# Patient Record
Sex: FEMALE | Race: WHITE | NOT HISPANIC OR LATINO | URBAN - METROPOLITAN AREA
[De-identification: names, ages, dates, MRNs, and addresses within clinical notes are randomized per-mention and may not be internally consistent; named-entity substitution may affect disease eponyms.]

---

## 2018-07-18 ENCOUNTER — OUTPATIENT (OUTPATIENT)
Dept: OUTPATIENT SERVICES | Facility: HOSPITAL | Age: 48
LOS: 1 days | Discharge: HOME | End: 2018-07-18

## 2018-07-18 DIAGNOSIS — E61.1 IRON DEFICIENCY: ICD-10-CM

## 2018-07-18 DIAGNOSIS — R42 DIZZINESS AND GIDDINESS: ICD-10-CM

## 2018-07-18 DIAGNOSIS — E55.9 VITAMIN D DEFICIENCY, UNSPECIFIED: ICD-10-CM

## 2018-10-25 ENCOUNTER — TRANSCRIPTION ENCOUNTER (OUTPATIENT)
Age: 48
End: 2018-10-25

## 2018-12-28 ENCOUNTER — TRANSCRIPTION ENCOUNTER (OUTPATIENT)
Age: 48
End: 2018-12-28

## 2019-02-17 ENCOUNTER — TRANSCRIPTION ENCOUNTER (OUTPATIENT)
Age: 49
End: 2019-02-17

## 2019-02-20 ENCOUNTER — OUTPATIENT (OUTPATIENT)
Dept: OUTPATIENT SERVICES | Facility: HOSPITAL | Age: 49
LOS: 1 days | Discharge: HOME | End: 2019-02-20

## 2019-02-20 DIAGNOSIS — N39.0 URINARY TRACT INFECTION, SITE NOT SPECIFIED: ICD-10-CM

## 2019-07-26 ENCOUNTER — TRANSCRIPTION ENCOUNTER (OUTPATIENT)
Age: 49
End: 2019-07-26

## 2019-10-23 ENCOUNTER — TRANSCRIPTION ENCOUNTER (OUTPATIENT)
Age: 49
End: 2019-10-23

## 2019-11-02 ENCOUNTER — TRANSCRIPTION ENCOUNTER (OUTPATIENT)
Age: 49
End: 2019-11-02

## 2019-12-24 ENCOUNTER — TRANSCRIPTION ENCOUNTER (OUTPATIENT)
Age: 49
End: 2019-12-24

## 2020-07-13 ENCOUNTER — TRANSCRIPTION ENCOUNTER (OUTPATIENT)
Age: 50
End: 2020-07-13

## 2020-09-03 ENCOUNTER — APPOINTMENT (OUTPATIENT)
Dept: UROLOGY | Facility: CLINIC | Age: 50
End: 2020-09-03

## 2020-10-23 ENCOUNTER — TRANSCRIPTION ENCOUNTER (OUTPATIENT)
Age: 50
End: 2020-10-23

## 2020-12-14 ENCOUNTER — APPOINTMENT (OUTPATIENT)
Dept: UROLOGY | Facility: CLINIC | Age: 50
End: 2020-12-14

## 2021-01-10 ENCOUNTER — TRANSCRIPTION ENCOUNTER (OUTPATIENT)
Age: 51
End: 2021-01-10

## 2021-02-12 ENCOUNTER — LABORATORY RESULT (OUTPATIENT)
Age: 51
End: 2021-02-12

## 2021-02-12 ENCOUNTER — APPOINTMENT (OUTPATIENT)
Dept: UROGYNECOLOGY | Facility: CLINIC | Age: 51
End: 2021-02-12
Payer: MEDICAID

## 2021-02-12 ENCOUNTER — OUTPATIENT (OUTPATIENT)
Dept: OUTPATIENT SERVICES | Facility: HOSPITAL | Age: 51
LOS: 1 days | Discharge: HOME | End: 2021-02-12

## 2021-02-12 ENCOUNTER — TRANSCRIPTION ENCOUNTER (OUTPATIENT)
Age: 51
End: 2021-02-12

## 2021-02-12 VITALS
SYSTOLIC BLOOD PRESSURE: 120 MMHG | BODY MASS INDEX: 29.37 KG/M2 | HEIGHT: 64 IN | WEIGHT: 172 LBS | DIASTOLIC BLOOD PRESSURE: 70 MMHG

## 2021-02-12 DIAGNOSIS — N95.0 POSTMENOPAUSAL BLEEDING: ICD-10-CM

## 2021-02-12 DIAGNOSIS — Z87.898 PERSONAL HISTORY OF OTHER SPECIFIED CONDITIONS: ICD-10-CM

## 2021-02-12 DIAGNOSIS — R39.15 URGENCY OF URINATION: ICD-10-CM

## 2021-02-12 DIAGNOSIS — Z80.9 FAMILY HISTORY OF MALIGNANT NEOPLASM, UNSPECIFIED: ICD-10-CM

## 2021-02-12 DIAGNOSIS — N20.0 CALCULUS OF KIDNEY: ICD-10-CM

## 2021-02-12 PROCEDURE — 99205 OFFICE O/P NEW HI 60 MIN: CPT | Mod: 25

## 2021-02-12 PROCEDURE — 51701 INSERT BLADDER CATHETER: CPT

## 2021-02-13 LAB
APPEARANCE: ABNORMAL
BILIRUBIN URINE: NEGATIVE
BLOOD URINE: ABNORMAL
COLOR: YELLOW
GLUCOSE QUALITATIVE U: NEGATIVE
KETONES URINE: NEGATIVE
LEUKOCYTE ESTERASE URINE: NEGATIVE
NITRITE URINE: NEGATIVE
PH URINE: 6
PROTEIN URINE: NORMAL
SPECIFIC GRAVITY URINE: 1.02
UROBILINOGEN URINE: NORMAL

## 2021-02-13 NOTE — COUNSELING
[FreeTextEntry1] : \par We will notify you of the urine results if they are abnormal\par \par Please increase your water intake to 4 cups of water per day\par \par For 4-5 days, cut one item from the list out of your diet.\par \par After 4-5 days, it it makes a difference, you have to decide if it is worth keeping it out of your diet to help with the urinary issues.\par \par If it does not make a difference, you should add it back into your diet and remove another item for another 4-5 days.\par \par Please stop the myrbetriq\par \par Schedule bladder function testing with my PA, Raquel (UDS without reduction). This test has to be at least 2 weeks from the time you stop the myrbetriq. Please come with a full bladder\par \par Please call the office if you feel like you have an infection because we can not do the bladder function testing in the setting of an infection\par \par Please schedule the sonogram to make sure the womb/uterus is normal. Once the results are normal I can then prescribe vaginal estrogen cream to see if it will help with the burning\par \par Please sign a medical release form for us to get the cysto report from Dr Garcia's office\par \par

## 2021-02-13 NOTE — DISCUSSION/SUMMARY
[FreeTextEntry1] : \par Urinary urgency-\par The pathophysiology of the above condition was discussed with the patient. The patient was given information and education on pelvic floor muscle exercises/rehabilitation, avoidance of dietary bladder irritants, and other strategies to improve bladder control, such as scheduled voiding. She was counseled regarding further management strategies for overactive bladder and urge incontinence including pelvic floor physical therapy, medications or surgical management.\par \par We discussed pelvic floor muscle rehabilitation, sacral neuromodulation (Interstim device), and intravesical Botox A and peripheral tibial nerve stimulation (PTNS). The risks and benefits of all were reviewed and the patient voiced understanding and agrees to consider botox versus PTNS\par \par The patient voiced understanding and agrees to diet changes, stopping the myrbetriq, undergoing urodynamics for further evaluation and will consider either botox or PTNS for third line treatments. We will follow up on her urine tests.\par \par Postmenopausal bleeding-\par  Advised further workup with a pelvic ultrasound to make sure the uterine lining is thin. Advised the patient that without a workup there is always a risk of uterine cancer or precancer that is not being diagnosed. The patient voiced understanding and agrees to the workup.\par \par Vulvar burning-\par Advised the patient that the burning can be secondary to an acute UTI, irritation from recent UTI or sensitivity of the bladder or atrophy. Will send the urine to rule out infectious etiology, start diet changes as needed for sensitivity (was offered gabapentin but the patient declined) and will start treatment for atrophy after she undergoes a pelvic ultrasound and it is normal and will monitor her symptoms with the above treatment. The patient voiced understanding and agrees with the plan.\par \par \par

## 2021-02-13 NOTE — HISTORY OF PRESENT ILLNESS
[FreeTextEntry1] : \par Pt with pelvic floor dysfunction here for urogynecologic evaluation. She describes: \par \par Chief PFD: pressure to urinate\par \par Symptoms of burning in October 2020, seen in urgent care, told urine testing was negative. Had further burning, seen by gyn who said her urine was negative but to take macrobid. Did not have improvement so then was seen by urology who put her on cipro.\par Still has burning now, constant\par 11/20: cysto with possible urethral dilation: Dr Garcia\par 6/15/20: CT: small R stone\par 11/13/20: ultrasound: small R stone\par Records reviewed:\par 1/14/21 cysto: Dr Garcia: "urethral stricture dilated with scope", normal cysto\par \par Pelvic organ prolapse: no bulge, denies splinting\par Stress urinary incontinence: min\par Overactive bladder syndrome: on myrbetriq 50mg, for the last 6 weeks, slight improvement, s/p oxybutynin (no change in symptoms), s/p detrol no change, no glaucoma, voids q2hour secondary to urgency and pressure, no eneuresis, no urge incontinence, no glaucoma\par Voiding dysfunction: yes Incomplete bladder emptying, yes hesitancy \par Lower urinary tract/vaginal symptoms: as above UTIs per year, no hematuria, no dysuria, no bladder pain \par Fecal incontinence: denies\par Defecatory dysfunction: sausage\par Sexual dysfunction: not active\par Pelvic pain: denies\par Vaginal dryness : yes, also reports staining, last time was last week\par \par Her pelvic floor symptoms are significantly bothersome and negatively impacting her quality of life. \par \par

## 2021-02-13 NOTE — PHYSICAL EXAM
[Chaperone Present] : A chaperone was present in the examining room during all aspects of the physical examination [FreeTextEntry1] : Void: 50 cc\par PVR: 5 cc\par Urethra was prepped in sterile fashion and then a sterile catheter was used by me to drain the bladder.\par \par No abdominal incisions noted\par normal perineal sensation\par normal perineal reflexes\par negative cough stress test\par positive atrophy\par positive vestibular tenderness\par no prolapse\par positive urethral hypermobility\par bilateral levator ani spasm,  positive tenderness\par no urethral tenderness\par no bladder tenderness\par mild cervical tenderness\par 1/5 Kegel\par

## 2021-02-15 LAB — BACTERIA UR CULT: NORMAL

## 2021-02-17 ENCOUNTER — NON-APPOINTMENT (OUTPATIENT)
Age: 51
End: 2021-02-17

## 2021-03-10 ENCOUNTER — OUTPATIENT (OUTPATIENT)
Dept: OUTPATIENT SERVICES | Facility: HOSPITAL | Age: 51
LOS: 1 days | Discharge: HOME | End: 2021-03-10
Payer: MEDICAID

## 2021-03-10 ENCOUNTER — RESULT REVIEW (OUTPATIENT)
Age: 51
End: 2021-03-10

## 2021-03-10 DIAGNOSIS — N95.0 POSTMENOPAUSAL BLEEDING: ICD-10-CM

## 2021-03-10 PROCEDURE — 76830 TRANSVAGINAL US NON-OB: CPT | Mod: 26

## 2021-03-19 ENCOUNTER — RESULT CHARGE (OUTPATIENT)
Age: 51
End: 2021-03-19

## 2021-03-19 ENCOUNTER — OUTPATIENT (OUTPATIENT)
Dept: OUTPATIENT SERVICES | Facility: HOSPITAL | Age: 51
LOS: 1 days | Discharge: HOME | End: 2021-03-19

## 2021-03-19 ENCOUNTER — APPOINTMENT (OUTPATIENT)
Dept: UROGYNECOLOGY | Facility: CLINIC | Age: 51
End: 2021-03-19
Payer: MEDICAID

## 2021-03-19 VITALS — BODY MASS INDEX: 29.52 KG/M2 | WEIGHT: 172 LBS | SYSTOLIC BLOOD PRESSURE: 122 MMHG | DIASTOLIC BLOOD PRESSURE: 76 MMHG

## 2021-03-19 PROCEDURE — 51784 ANAL/URINARY MUSCLE STUDY: CPT | Mod: 26

## 2021-03-19 PROCEDURE — 51741 ELECTRO-UROFLOWMETRY FIRST: CPT | Mod: 26

## 2021-03-19 PROCEDURE — 51728 CYSTOMETROGRAM W/VP: CPT | Mod: 26

## 2021-03-19 PROCEDURE — 51797 INTRAABDOMINAL PRESSURE TEST: CPT | Mod: 26

## 2021-03-19 RX ORDER — MIRABEGRON 50 MG/1
50 TABLET, FILM COATED, EXTENDED RELEASE ORAL
Refills: 0 | Status: DISCONTINUED | COMMUNITY
End: 2021-03-19

## 2021-03-22 LAB
BILIRUB UR QL STRIP: NORMAL
CLARITY UR: CLEAR
COLLECTION METHOD: NORMAL
GLUCOSE UR-MCNC: NORMAL
HCG UR QL: 0.2 EU/DL
HCG UR QL: NEGATIVE
HGB UR QL STRIP.AUTO: NORMAL
KETONES UR-MCNC: NORMAL
LEUKOCYTE ESTERASE UR QL STRIP: NORMAL
NITRITE UR QL STRIP: NORMAL
PH UR STRIP: 6
PROT UR STRIP-MCNC: NORMAL
QUALITY CONTROL: YES
SP GR UR STRIP: 1.01

## 2021-03-30 ENCOUNTER — APPOINTMENT (OUTPATIENT)
Dept: NEUROLOGY | Facility: CLINIC | Age: 51
End: 2021-03-30

## 2021-04-07 ENCOUNTER — APPOINTMENT (OUTPATIENT)
Dept: UROGYNECOLOGY | Facility: CLINIC | Age: 51
End: 2021-04-07
Payer: MEDICAID

## 2021-04-07 ENCOUNTER — OUTPATIENT (OUTPATIENT)
Dept: OUTPATIENT SERVICES | Facility: HOSPITAL | Age: 51
LOS: 1 days | Discharge: HOME | End: 2021-04-07

## 2021-04-07 VITALS
HEIGHT: 64 IN | SYSTOLIC BLOOD PRESSURE: 122 MMHG | DIASTOLIC BLOOD PRESSURE: 70 MMHG | WEIGHT: 172 LBS | BODY MASS INDEX: 29.37 KG/M2

## 2021-04-07 DIAGNOSIS — Z87.42 PERSONAL HISTORY OF OTHER DISEASES OF THE FEMALE GENITAL TRACT: ICD-10-CM

## 2021-04-07 PROCEDURE — 99213 OFFICE O/P EST LOW 20 MIN: CPT

## 2021-04-10 NOTE — DISCUSSION/SUMMARY
[FreeTextEntry1] : \par Urinary urgency-\par We discussed pelvic floor muscle rehabilitation, sacral neuromodulation (Interstim device), and intravesical Botox A and peripheral tibial nerve stimulation (PTNS). The risks and benefits of all were reviewed and the patient voiced understanding and agrees to starting PTNS. Will obtain insurance authorization and schedule the patient for PTNS appointments.\par \par

## 2021-04-10 NOTE — HISTORY OF PRESENT ILLNESS
[FreeTextEntry1] : \par Patient is here for further counseling about urinary urgency\par 6/15/20: CT: small R stone\par 11/13/20: ultrasound: small R stone\par Records reviewed:\par 1/14/21 cysto: Dr Garcia: "urethral stricture dilated with scope", normal cysto\par \par on myrbetriq 50mg, for the last 6 weeks, slight improvement\par s/p oxybutynin (no change in symptoms)\par s/p detrol no change, no glaucoma\par \par 3/19/2021 \par Impression: no USUI, can't rule out obstructive voiding since unable to void with catheters in place.\par Plan: 3rd line treatments for urinary urgency\par

## 2021-04-10 NOTE — COUNSELING
[FreeTextEntry1] : \par We will contact you to schedule the 12 visits once we have the authorization from your insurance company for the PTNS\par \par Apply a pea size amount of the cream to the opening of the vagina every night for two weeks followed by three nights per week\par \par Call with any issues

## 2021-04-15 ENCOUNTER — NON-APPOINTMENT (OUTPATIENT)
Age: 51
End: 2021-04-15

## 2021-05-05 ENCOUNTER — RESULT CHARGE (OUTPATIENT)
Age: 51
End: 2021-05-05

## 2021-05-05 ENCOUNTER — OUTPATIENT (OUTPATIENT)
Dept: OUTPATIENT SERVICES | Facility: HOSPITAL | Age: 51
LOS: 1 days | Discharge: HOME | End: 2021-05-05

## 2021-05-05 ENCOUNTER — APPOINTMENT (OUTPATIENT)
Dept: UROGYNECOLOGY | Facility: CLINIC | Age: 51
End: 2021-05-05
Payer: MEDICAID

## 2021-05-05 VITALS
SYSTOLIC BLOOD PRESSURE: 102 MMHG | WEIGHT: 172 LBS | DIASTOLIC BLOOD PRESSURE: 72 MMHG | BODY MASS INDEX: 29.37 KG/M2 | HEIGHT: 64 IN

## 2021-05-05 LAB
BILIRUB UR QL STRIP: NORMAL
CLARITY UR: CLEAR
COLLECTION METHOD: NORMAL
GLUCOSE UR-MCNC: NORMAL
HCG UR QL: 0.2 EU/DL
HCG UR QL: NEGATIVE
HGB UR QL STRIP.AUTO: NORMAL
KETONES UR-MCNC: NORMAL
LEUKOCYTE ESTERASE UR QL STRIP: NORMAL
NITRITE UR QL STRIP: NORMAL
PH UR STRIP: 7
PROT UR STRIP-MCNC: NORMAL
QUALITY CONTROL: NO
SP GR UR STRIP: 1.03

## 2021-05-05 PROCEDURE — 64566 NEUROELTRD STIM POST TIBIAL: CPT

## 2021-05-12 ENCOUNTER — APPOINTMENT (OUTPATIENT)
Dept: UROGYNECOLOGY | Facility: CLINIC | Age: 51
End: 2021-05-12
Payer: MEDICAID

## 2021-05-12 ENCOUNTER — OUTPATIENT (OUTPATIENT)
Dept: OUTPATIENT SERVICES | Facility: HOSPITAL | Age: 51
LOS: 1 days | Discharge: HOME | End: 2021-05-12

## 2021-05-12 VITALS — BODY MASS INDEX: 29.01 KG/M2 | WEIGHT: 169 LBS | DIASTOLIC BLOOD PRESSURE: 74 MMHG | SYSTOLIC BLOOD PRESSURE: 110 MMHG

## 2021-05-12 PROCEDURE — 64566 NEUROELTRD STIM POST TIBIAL: CPT

## 2021-05-19 ENCOUNTER — OUTPATIENT (OUTPATIENT)
Dept: OUTPATIENT SERVICES | Facility: HOSPITAL | Age: 51
LOS: 1 days | Discharge: HOME | End: 2021-05-19

## 2021-05-19 ENCOUNTER — APPOINTMENT (OUTPATIENT)
Dept: UROGYNECOLOGY | Facility: CLINIC | Age: 51
End: 2021-05-19
Payer: MEDICAID

## 2021-05-19 VITALS
SYSTOLIC BLOOD PRESSURE: 122 MMHG | BODY MASS INDEX: 28.85 KG/M2 | DIASTOLIC BLOOD PRESSURE: 84 MMHG | WEIGHT: 169 LBS | HEIGHT: 64 IN

## 2021-05-19 PROCEDURE — 64566 NEUROELTRD STIM POST TIBIAL: CPT

## 2021-05-24 DIAGNOSIS — R39.15 URGENCY OF URINATION: ICD-10-CM

## 2021-05-26 ENCOUNTER — OUTPATIENT (OUTPATIENT)
Dept: OUTPATIENT SERVICES | Facility: HOSPITAL | Age: 51
LOS: 1 days | Discharge: HOME | End: 2021-05-26

## 2021-05-26 ENCOUNTER — APPOINTMENT (OUTPATIENT)
Dept: UROGYNECOLOGY | Facility: CLINIC | Age: 51
End: 2021-05-26
Payer: MEDICAID

## 2021-05-26 VITALS
WEIGHT: 169 LBS | HEIGHT: 64 IN | BODY MASS INDEX: 28.85 KG/M2 | SYSTOLIC BLOOD PRESSURE: 124 MMHG | DIASTOLIC BLOOD PRESSURE: 76 MMHG

## 2021-05-26 PROCEDURE — 64566 NEUROELTRD STIM POST TIBIAL: CPT

## 2021-06-01 DIAGNOSIS — R39.15 URGENCY OF URINATION: ICD-10-CM

## 2021-06-02 ENCOUNTER — APPOINTMENT (OUTPATIENT)
Dept: UROGYNECOLOGY | Facility: CLINIC | Age: 51
End: 2021-06-02
Payer: MEDICAID

## 2021-06-02 ENCOUNTER — OUTPATIENT (OUTPATIENT)
Dept: OUTPATIENT SERVICES | Facility: HOSPITAL | Age: 51
LOS: 1 days | Discharge: HOME | End: 2021-06-02

## 2021-06-02 VITALS — BODY MASS INDEX: 28.49 KG/M2 | DIASTOLIC BLOOD PRESSURE: 70 MMHG | SYSTOLIC BLOOD PRESSURE: 112 MMHG | WEIGHT: 166 LBS

## 2021-06-02 PROCEDURE — 64566 NEUROELTRD STIM POST TIBIAL: CPT

## 2021-06-08 DIAGNOSIS — R39.15 URGENCY OF URINATION: ICD-10-CM

## 2021-06-09 ENCOUNTER — OUTPATIENT (OUTPATIENT)
Dept: OUTPATIENT SERVICES | Facility: HOSPITAL | Age: 51
LOS: 1 days | Discharge: HOME | End: 2021-06-09

## 2021-06-09 ENCOUNTER — APPOINTMENT (OUTPATIENT)
Dept: UROGYNECOLOGY | Facility: CLINIC | Age: 51
End: 2021-06-09
Payer: MEDICAID

## 2021-06-09 VITALS — WEIGHT: 166 LBS | DIASTOLIC BLOOD PRESSURE: 80 MMHG | SYSTOLIC BLOOD PRESSURE: 116 MMHG | BODY MASS INDEX: 28.49 KG/M2

## 2021-06-09 PROCEDURE — 64566 NEUROELTRD STIM POST TIBIAL: CPT

## 2021-06-11 ENCOUNTER — NON-APPOINTMENT (OUTPATIENT)
Age: 51
End: 2021-06-11

## 2021-06-11 LAB — BACTERIA UR CULT: NORMAL

## 2021-06-16 ENCOUNTER — OUTPATIENT (OUTPATIENT)
Dept: OUTPATIENT SERVICES | Facility: HOSPITAL | Age: 51
LOS: 1 days | Discharge: HOME | End: 2021-06-16

## 2021-06-16 ENCOUNTER — APPOINTMENT (OUTPATIENT)
Dept: UROGYNECOLOGY | Facility: CLINIC | Age: 51
End: 2021-06-16
Payer: MEDICAID

## 2021-06-16 VITALS
WEIGHT: 166 LBS | HEIGHT: 64 IN | BODY MASS INDEX: 28.34 KG/M2 | SYSTOLIC BLOOD PRESSURE: 118 MMHG | DIASTOLIC BLOOD PRESSURE: 78 MMHG

## 2021-06-16 PROCEDURE — 64566 NEUROELTRD STIM POST TIBIAL: CPT

## 2021-06-23 ENCOUNTER — APPOINTMENT (OUTPATIENT)
Dept: UROGYNECOLOGY | Facility: CLINIC | Age: 51
End: 2021-06-23
Payer: MEDICAID

## 2021-06-23 ENCOUNTER — OUTPATIENT (OUTPATIENT)
Dept: OUTPATIENT SERVICES | Facility: HOSPITAL | Age: 51
LOS: 1 days | Discharge: HOME | End: 2021-06-23

## 2021-06-23 VITALS
SYSTOLIC BLOOD PRESSURE: 118 MMHG | HEIGHT: 64 IN | BODY MASS INDEX: 28.34 KG/M2 | WEIGHT: 166 LBS | DIASTOLIC BLOOD PRESSURE: 76 MMHG

## 2021-06-23 PROCEDURE — 64566 NEUROELTRD STIM POST TIBIAL: CPT

## 2021-06-25 RX ORDER — TROSPIUM CHLORIDE 20 MG/1
20 TABLET, FILM COATED ORAL
Qty: 60 | Refills: 1 | Status: DISCONTINUED | COMMUNITY
Start: 2021-06-23 | End: 2021-06-25

## 2021-06-30 ENCOUNTER — APPOINTMENT (OUTPATIENT)
Dept: UROGYNECOLOGY | Facility: CLINIC | Age: 51
End: 2021-06-30
Payer: MEDICAID

## 2021-06-30 ENCOUNTER — OUTPATIENT (OUTPATIENT)
Dept: OUTPATIENT SERVICES | Facility: HOSPITAL | Age: 51
LOS: 1 days | Discharge: HOME | End: 2021-06-30

## 2021-06-30 VITALS
WEIGHT: 166 LBS | SYSTOLIC BLOOD PRESSURE: 130 MMHG | HEIGHT: 64 IN | BODY MASS INDEX: 28.34 KG/M2 | DIASTOLIC BLOOD PRESSURE: 86 MMHG

## 2021-06-30 DIAGNOSIS — N95.2 POSTMENOPAUSAL ATROPHIC VAGINITIS: ICD-10-CM

## 2021-06-30 DIAGNOSIS — R39.15 URGENCY OF URINATION: ICD-10-CM

## 2021-06-30 DIAGNOSIS — R30.1 VESICAL TENESMUS: ICD-10-CM

## 2021-06-30 PROCEDURE — 99214 OFFICE O/P EST MOD 30 MIN: CPT

## 2021-06-30 RX ORDER — OXYBUTYNIN CHLORIDE 5 MG/1
5 TABLET ORAL
Qty: 60 | Refills: 1 | Status: DISCONTINUED | COMMUNITY
Start: 2021-06-25 | End: 2021-06-30

## 2021-07-07 ENCOUNTER — APPOINTMENT (OUTPATIENT)
Dept: UROGYNECOLOGY | Facility: CLINIC | Age: 51
End: 2021-07-07

## 2021-07-14 ENCOUNTER — NON-APPOINTMENT (OUTPATIENT)
Age: 51
End: 2021-07-14

## 2021-07-14 ENCOUNTER — APPOINTMENT (OUTPATIENT)
Dept: UROGYNECOLOGY | Facility: CLINIC | Age: 51
End: 2021-07-14

## 2021-07-21 ENCOUNTER — APPOINTMENT (OUTPATIENT)
Dept: UROGYNECOLOGY | Facility: CLINIC | Age: 51
End: 2021-07-21

## 2021-07-23 ENCOUNTER — RX RENEWAL (OUTPATIENT)
Age: 51
End: 2021-07-23

## 2021-08-06 ENCOUNTER — LABORATORY RESULT (OUTPATIENT)
Age: 51
End: 2021-08-06

## 2021-08-06 ENCOUNTER — APPOINTMENT (OUTPATIENT)
Dept: UROGYNECOLOGY | Facility: CLINIC | Age: 51
End: 2021-08-06
Payer: MEDICAID

## 2021-08-06 ENCOUNTER — OUTPATIENT (OUTPATIENT)
Dept: OUTPATIENT SERVICES | Facility: HOSPITAL | Age: 51
LOS: 1 days | Discharge: HOME | End: 2021-08-06

## 2021-08-06 ENCOUNTER — RESULT CHARGE (OUTPATIENT)
Age: 51
End: 2021-08-06

## 2021-08-06 VITALS
DIASTOLIC BLOOD PRESSURE: 66 MMHG | WEIGHT: 166 LBS | SYSTOLIC BLOOD PRESSURE: 124 MMHG | BODY MASS INDEX: 28.34 KG/M2 | HEIGHT: 64 IN

## 2021-08-06 LAB
BILIRUB UR QL STRIP: NORMAL
CLARITY UR: CLEAR
COLLECTION METHOD: NORMAL
GLUCOSE UR-MCNC: NORMAL
HCG UR QL: 0.2 EU/DL
HGB UR QL STRIP.AUTO: NORMAL
KETONES UR-MCNC: NORMAL
LEUKOCYTE ESTERASE UR QL STRIP: NORMAL
NITRITE UR QL STRIP: NORMAL
PH UR STRIP: 6
PROT UR STRIP-MCNC: NORMAL
SP GR UR STRIP: 1.02

## 2021-08-06 PROCEDURE — 99215 OFFICE O/P EST HI 40 MIN: CPT | Mod: 25

## 2021-08-06 PROCEDURE — 51701 INSERT BLADDER CATHETER: CPT

## 2021-08-06 NOTE — PHYSICAL EXAM
[Chaperone Present] : A chaperone was present in the examining room during all aspects of the physical examination [FreeTextEntry1] : Void: 100cc\par PVR 10cc\par Urethra was prepped in sterile fashion and then a sterile catheter was used by me to drain the bladder.\par \par +atrophy\par +grayish discharge\par no urethral/bladder/cervical tenderness\par bilateral levator ani spasms and tenderness\par no prolapse\par positive urethral hypermobility\par 1/5 nick

## 2021-08-06 NOTE — HISTORY OF PRESENT ILLNESS
[FreeTextEntry1] : \par The patient is here for follow up for her urinary urgency and dysuria\par s/p myrbetriq 50mg no change\par s/p oxybutynin no change\par s/p detrol no change\par s/p PTNS, 8 sessions only, no change\par Was started on trospium 20mg twice a day on 7/14/21\par \par I spoke to the patient on the phone on 7/12/21 where she reported that her burning and dysuria improves from night until 12pm the next day while taking the gabapentin 100mg nightly. She reports that she then has pain again in the afternoon. I advised her to increase the dosing to 100mg twice a day.\par \par Patient reports that she had an increase of pain and burning two weeks ago, so she stopped all of her medications, including gabapentin, trospium and estrace cream.\par She took the trospium (1 pill) only that morning and felt like she could not urinate and has not used the trospium since\par Was seen by her gyn, who told her that the vagina does not have an infection, urine dip showed bacteria and they started her on cipro bid for 5 days.\par \par Today, she says she can't deal with the significant burning and frequency

## 2021-08-06 NOTE — DISCUSSION/SUMMARY
[FreeTextEntry1] : \par Dysuria-\par Advised the patient that dysuria can be secondary to an acute UTI, irritation from recent UTI or sensitivity of the bladder or atrophy or vaginal infection. Will send the urine and vaginal culture to rule out infectious etiology, start the gabapentin 100mg twice a day for the sensitivity and will start treatment for presumed BV and restart the estrogen cream for atrophy and will monitor her symptoms with the above treatment. The patient voiced understanding and agrees with the plan.\par \par Urinary urgency-\par Patient was advised to restart the trospium 20mg twice a day and to call with any issues\par She will return in 6 weeks for follow up or earlier if she has any issues\par \par Vaginal discharge-\par Will start treatment with metrogel for 7 days for presumed bacterial vaginosis. Will send a vaginal culture.

## 2021-08-06 NOTE — COUNSELING
[FreeTextEntry1] : \par We will notify you of the urine and vaginal culture results.\par \par Please start the metrogel once a night for 7 days.\par \par Please restart the gabapentin 100mg, once at night and once in the morning for the burning and pain\par \par Please restart the trospium twice a day for the urgency and frequency\par \par Once you complete the metrogel treatment, then please restart applying a pea size amount of the estrogen cream to the opening of the vagina three nights per week\par \par Schedule 6 week follow up with Dr Hawkins

## 2021-08-09 ENCOUNTER — NON-APPOINTMENT (OUTPATIENT)
Age: 51
End: 2021-08-09

## 2021-08-09 LAB
APPEARANCE: ABNORMAL
BACTERIA UR CULT: NORMAL
BILIRUBIN URINE: NEGATIVE
BLOOD URINE: ABNORMAL
COLOR: YELLOW
GLUCOSE QUALITATIVE U: NEGATIVE
KETONES URINE: NEGATIVE
LEUKOCYTE ESTERASE URINE: ABNORMAL
NITRITE URINE: NEGATIVE
PH URINE: 6
PROTEIN URINE: ABNORMAL
SPECIFIC GRAVITY URINE: 1.03
UROBILINOGEN URINE: NORMAL

## 2021-08-09 RX ORDER — GABAPENTIN 100 MG/1
100 CAPSULE ORAL
Qty: 30 | Refills: 1 | Status: DISCONTINUED | COMMUNITY
Start: 2021-06-25 | End: 2021-08-09

## 2021-08-10 ENCOUNTER — NON-APPOINTMENT (OUTPATIENT)
Age: 51
End: 2021-08-10

## 2021-08-10 DIAGNOSIS — R39.15 URGENCY OF URINATION: ICD-10-CM

## 2021-08-10 DIAGNOSIS — R30.0 DYSURIA: ICD-10-CM

## 2021-08-10 DIAGNOSIS — N89.8 OTHER SPECIFIED NONINFLAMMATORY DISORDERS OF VAGINA: ICD-10-CM

## 2021-08-11 ENCOUNTER — LABORATORY RESULT (OUTPATIENT)
Age: 51
End: 2021-08-11

## 2021-08-11 ENCOUNTER — APPOINTMENT (OUTPATIENT)
Dept: UROGYNECOLOGY | Facility: CLINIC | Age: 51
End: 2021-08-11
Payer: MEDICAID

## 2021-08-11 ENCOUNTER — OUTPATIENT (OUTPATIENT)
Dept: OUTPATIENT SERVICES | Facility: HOSPITAL | Age: 51
LOS: 1 days | Discharge: HOME | End: 2021-08-11

## 2021-08-11 VITALS
HEIGHT: 64 IN | DIASTOLIC BLOOD PRESSURE: 60 MMHG | BODY MASS INDEX: 28.34 KG/M2 | SYSTOLIC BLOOD PRESSURE: 100 MMHG | WEIGHT: 166 LBS

## 2021-08-11 PROCEDURE — 51701 INSERT BLADDER CATHETER: CPT

## 2021-08-11 PROCEDURE — 99214 OFFICE O/P EST MOD 30 MIN: CPT | Mod: 25

## 2021-08-12 LAB
APPEARANCE: ABNORMAL
BILIRUBIN URINE: NEGATIVE
BLOOD URINE: ABNORMAL
COLOR: NORMAL
GLUCOSE QUALITATIVE U: NEGATIVE
KETONES URINE: NEGATIVE
LEUKOCYTE ESTERASE URINE: ABNORMAL
NITRITE URINE: NEGATIVE
PH URINE: 6
PROTEIN URINE: ABNORMAL
SPECIFIC GRAVITY URINE: 1.01
UROBILINOGEN URINE: NORMAL

## 2021-08-13 NOTE — DISCUSSION/SUMMARY
[FreeTextEntry1] : Dysuria\par Cont Gabapentin 100mg BID\par Cont Estrogen cream\par \par Urinary urgency\par Stop Trospium\par \par Follow up 6 weeks with Dr Hawkins

## 2021-08-13 NOTE — HISTORY OF PRESENT ILLNESS
[FreeTextEntry1] : Patient is here for PVR check.\par Last seen on 8/6/21 for follow up on urgency and dysuria. \par \par s/p myrbetriq 50mg no change\par s/p oxybutynin no change\par s/p detrol no change\par s/p PTNS, 8 sessions only, no change\par Was started on trospium 20mg twice a day on 7/14/21\par \par s/p PTNS failed. \par \par Today pt states she feels that she cannot urinate even after taking 1 tab of Trospium. She took it this morning and feels that she does not empty her bladder. Takes Gabapentin 100mg BID. C/o urgency and pain, burning with urination.

## 2021-08-13 NOTE — PHYSICAL EXAM
[No Acute Distress] : in no acute distress [Well developed] : well developed [Well Nourished] : ~L well nourished [Chaperone Present] : A chaperone was present in the examining room during all aspects of the physical examination [FreeTextEntry1] : Urethra was prepped in sterile fashion and then a sterile catheter was used by me to drain the bladder.\par void: 150cc\par PVR: 210cc

## 2021-08-13 NOTE — COUNSELING
[FreeTextEntry1] : We will contact you if the urine results are abnormal.\par \par If you feel like you have an infection it is important for you to call our office and we will arrange testing of your urine.\par \par Please continue taking Gabapentin 100mg twice a day for pain and burning. Refills sent to your pharmacy.\par \par Please continue to apply a pea size amount to the opening of the vagina three times a week. \par \par Please call my office if you have any issues with the cost or side effects of the medication. \par \par Please schedule an appointment in 6 weeks with Dr Hawkins for medication check. \par

## 2021-08-16 ENCOUNTER — NON-APPOINTMENT (OUTPATIENT)
Age: 51
End: 2021-08-16

## 2021-08-16 LAB — BACTERIA UR CULT: ABNORMAL

## 2021-08-16 RX ORDER — NITROFURANTOIN (MONOHYDRATE/MACROCRYSTALS) 25; 75 MG/1; MG/1
100 CAPSULE ORAL
Qty: 14 | Refills: 0 | Status: COMPLETED | COMMUNITY
Start: 2021-08-16 | End: 2021-08-23

## 2021-08-25 ENCOUNTER — LABORATORY RESULT (OUTPATIENT)
Age: 51
End: 2021-08-25

## 2021-08-25 ENCOUNTER — APPOINTMENT (OUTPATIENT)
Dept: UROGYNECOLOGY | Facility: CLINIC | Age: 51
End: 2021-08-25
Payer: MEDICAID

## 2021-08-25 ENCOUNTER — NON-APPOINTMENT (OUTPATIENT)
Age: 51
End: 2021-08-25

## 2021-08-25 ENCOUNTER — OUTPATIENT (OUTPATIENT)
Dept: OUTPATIENT SERVICES | Facility: HOSPITAL | Age: 51
LOS: 1 days | Discharge: HOME | End: 2021-08-25

## 2021-08-25 VITALS
DIASTOLIC BLOOD PRESSURE: 75 MMHG | WEIGHT: 165.56 LBS | BODY MASS INDEX: 28.42 KG/M2 | SYSTOLIC BLOOD PRESSURE: 115 MMHG

## 2021-08-25 PROCEDURE — 51701 INSERT BLADDER CATHETER: CPT

## 2021-08-25 PROCEDURE — 99214 OFFICE O/P EST MOD 30 MIN: CPT | Mod: 25

## 2021-08-25 RX ORDER — TROSPIUM CHLORIDE 20 MG/1
20 TABLET, FILM COATED ORAL
Qty: 60 | Refills: 1 | Status: DISCONTINUED | COMMUNITY
Start: 2021-07-14 | End: 2021-08-25

## 2021-08-25 RX ORDER — METRONIDAZOLE 7.5 MG/G
0.75 GEL VAGINAL
Qty: 1 | Refills: 0 | Status: DISCONTINUED | COMMUNITY
Start: 2021-08-06 | End: 2021-08-25

## 2021-08-25 RX ORDER — ESTRADIOL 0.1 MG/G
0.1 CREAM VAGINAL
Qty: 42.5 | Refills: 3 | Status: DISCONTINUED | COMMUNITY
Start: 2021-03-10 | End: 2021-08-25

## 2021-08-25 RX ORDER — GABAPENTIN 100 MG/1
100 CAPSULE ORAL
Qty: 60 | Refills: 1 | Status: DISCONTINUED | COMMUNITY
Start: 2021-07-12 | End: 2021-08-25

## 2021-08-25 NOTE — PHYSICAL EXAM
[Chaperone Present] : A chaperone was present in the examining room during all aspects of the physical examination [FreeTextEntry1] : Urethra was prepped in sterile fashion and then a sterile catheter was used by me to drain the bladder.\par void: 250cc\par PVR: 20cc

## 2021-08-25 NOTE — HISTORY OF PRESENT ILLNESS
[FreeTextEntry1] : Patient is here for  follow up for ANUSHKA/PVR check\par Last seen on 8/11/21 for med check.\par \par s/p myrbetriq 50mg no change\par s/p oxybutynin no change\par s/p detrol no change\par s/p PTNS, 8 sessions only, no change\par s/p trospium 20mg twice a day, took 1 tab and caused MOON, PVR: 210cc\par \par s/p PTNS failed. \par \par 8/11. 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 Denies any pain or pressure. Finished Macrobid for UTI, increased water intake to 5 bottles a day. Feels that she has a better stream when urinating. Still c/o frequency and urgency. Patient does not feel she has an infection.\par \par Patient would like to try another bladder medication.

## 2021-08-25 NOTE — COUNSELING
[FreeTextEntry1] : If you feel like you have an infection it is important for you to call our office and we will arrange testing of your urine.\par \par We will contact you if the urine results are abnormal.\par \par Please begin taking Detrol LA 4 mg. It takes up to 6 weeks to go into full effect. Please  your refill when you complete the 1st bottle.\par \par Please continue to apply a pea size amount to the opening of the vagina three times a week. \par \par Please call my office if you have any issues with the cost or side effects of the medication. \par \par Schedule a 6 weeks follow up med check appointment.\par

## 2021-08-26 LAB
APPEARANCE: CLEAR
BILIRUBIN URINE: NEGATIVE
BLOOD URINE: ABNORMAL
COLOR: COLORLESS
GLUCOSE QUALITATIVE U: NEGATIVE
KETONES URINE: NEGATIVE
LEUKOCYTE ESTERASE URINE: ABNORMAL
NITRITE URINE: NEGATIVE
PH URINE: 6.5
PROTEIN URINE: NORMAL
SPECIFIC GRAVITY URINE: 1
UROBILINOGEN URINE: NORMAL

## 2021-08-27 LAB — BACTERIA UR CULT: NORMAL

## 2021-09-13 LAB
ANION GAP SERPL CALC-SCNC: 13 MMOL/L
BUN SERPL-MCNC: 12 MG/DL
CALCIUM SERPL-MCNC: 9.8 MG/DL
CHLORIDE SERPL-SCNC: 101 MMOL/L
CO2 SERPL-SCNC: 26 MMOL/L
CREAT SERPL-MCNC: 0.9 MG/DL
GLUCOSE SERPL-MCNC: 88 MG/DL
POTASSIUM SERPL-SCNC: 4.3 MMOL/L
SODIUM SERPL-SCNC: 140 MMOL/L

## 2021-09-14 ENCOUNTER — NON-APPOINTMENT (OUTPATIENT)
Age: 51
End: 2021-09-14

## 2021-10-21 ENCOUNTER — APPOINTMENT (OUTPATIENT)
Dept: UROLOGY | Facility: CLINIC | Age: 51
End: 2021-10-21

## 2021-11-03 ENCOUNTER — APPOINTMENT (OUTPATIENT)
Dept: UROGYNECOLOGY | Facility: CLINIC | Age: 51
End: 2021-11-03
Payer: MEDICAID

## 2021-11-03 ENCOUNTER — OUTPATIENT (OUTPATIENT)
Dept: OUTPATIENT SERVICES | Facility: HOSPITAL | Age: 51
LOS: 1 days | Discharge: HOME | End: 2021-11-03

## 2021-11-03 VITALS — BODY MASS INDEX: 28.32 KG/M2 | WEIGHT: 165 LBS | SYSTOLIC BLOOD PRESSURE: 118 MMHG | DIASTOLIC BLOOD PRESSURE: 74 MMHG

## 2021-11-03 DIAGNOSIS — Z87.42 PERSONAL HISTORY OF OTHER DISEASES OF THE FEMALE GENITAL TRACT: ICD-10-CM

## 2021-11-03 DIAGNOSIS — Z87.898 PERSONAL HISTORY OF OTHER SPECIFIED CONDITIONS: ICD-10-CM

## 2021-11-03 DIAGNOSIS — N94.89 OTHER SPECIFIED CONDITIONS ASSOCIATED WITH FEMALE GENITAL ORGANS AND MENSTRUAL CYCLE: ICD-10-CM

## 2021-11-03 DIAGNOSIS — R30.1 VESICAL TENESMUS: ICD-10-CM

## 2021-11-03 PROCEDURE — 99214 OFFICE O/P EST MOD 30 MIN: CPT

## 2021-11-04 DIAGNOSIS — N39.0 URINARY TRACT INFECTION, SITE NOT SPECIFIED: ICD-10-CM

## 2021-11-04 DIAGNOSIS — R39.15 URGENCY OF URINATION: ICD-10-CM

## 2021-11-04 DIAGNOSIS — R30.1 VESICAL TENESMUS: ICD-10-CM

## 2021-11-06 PROBLEM — Z87.42 HISTORY OF VAGINAL DISCHARGE: Status: RESOLVED | Noted: 2021-08-06 | Resolved: 2021-11-06

## 2021-11-06 PROBLEM — N94.89 VULVAR BURNING: Status: RESOLVED | Noted: 2021-02-12 | Resolved: 2021-05-12

## 2021-11-06 PROBLEM — Z87.898 HISTORY OF DYSURIA: Status: RESOLVED | Noted: 2021-08-06 | Resolved: 2021-11-06

## 2021-11-06 NOTE — HISTORY OF PRESENT ILLNESS
[FreeTextEntry1] : \par The patient is here for follow up for recurrent UTI, painful bladder spasms and urinary urgency\par Last seen for a test of cure on 8/25/21 \par \par Recurrent UTI-\par \par Started on macrobid 100mg daily suppression on 9/14/21. Would like to continue with suppression for another 3 months\par Using estrogen cream minimally\par Records reviewed:\par 1/14/21: cysto: +urethral stricture dilated with scope, +trabeculations, no lesions\par 1/25/21: CT: non obstructing R stone\par \par Painful bladder spasms-\par Was advised to start gabapentin 100mg twice a day\par Stopped it once she was started on suppression (macrobid) and the dysuria and burning has improved while on suppression\par \par Urinary urgency-\par s/p myrbetriq 50mg no change\par s/p oxybutynin no change\par s/p detrol no change\par s/p PTNS, 8 sessions only, no change\par s/p trospium 20mg twice a day, took 1 tab and caused incomplete bladder emptying, PVR: 210cc\par Was going to restart detrol LA but today reports that she did not restart it and that her urgency has improved since starting suppression for the recurrent UTI\par \par Today, the patient reports that she is feeling better since starting suppression. She said she was recently seen by a covering urologist for Dr Garcia, who told her that the stone that she has is likely infected and is the cause of her recurrent UTI and dysuria. He said that "blasting it would likely not work because of it's size and he would need to remove it surgically"\par She wants a second opinion about the kidney stone

## 2021-11-06 NOTE — DISCUSSION/SUMMARY
[FreeTextEntry1] : \par Recurrent UTI-\par Workup complete and negative. Advised the patient to continue with daily suppression and estrogen vaginal cream three times per week and to call if she feels like she has an infection. The patient voiced understanding and agrees with the plan.\par \par Advised that I will arrange a consultation with Dr Gustafson for a second opinion for the kidney stone.\par \par Painful bladder spasms-\par Dysuria and bladder pain has improved since recurrent UTI diagnosis and suppression started.\par \par Urinary urgency-\par Slightly improved with start of suppression. Does not want further management at this time.

## 2021-11-06 NOTE — COUNSELING
[FreeTextEntry1] : \par Please call the office if you feel like you have an infection so that we can arrange testing of your urine\par \par Please continue to take the macrobid daily. Please call us when you  the last refill so that we can put in another 3 months worth of refills\par \par We will call you once Dr Gustafson responds to my message\par \par Schedule 6 month follow up with Dr Hawkins ([facility])

## 2021-11-30 ENCOUNTER — APPOINTMENT (OUTPATIENT)
Dept: UROLOGY | Facility: CLINIC | Age: 51
End: 2021-11-30
Payer: MEDICAID

## 2021-11-30 ENCOUNTER — NON-APPOINTMENT (OUTPATIENT)
Age: 51
End: 2021-11-30

## 2021-11-30 ENCOUNTER — LABORATORY RESULT (OUTPATIENT)
Age: 51
End: 2021-11-30

## 2021-11-30 VITALS — WEIGHT: 164 LBS | HEIGHT: 64 IN | BODY MASS INDEX: 28 KG/M2

## 2021-11-30 PROCEDURE — 99204 OFFICE O/P NEW MOD 45 MIN: CPT

## 2021-11-30 NOTE — HISTORY OF PRESENT ILLNESS
[FreeTextEntry1] : EMELY SCHULTZ is a 51 year old female who presents for consultation for recurrent UTIs on macrobid suppression, OAB sp PTNS, gross hematuria and consultation for nephrolithiasis.\par \par Patient reports severe urinary frequency and urgency for the last few years.  She has not had no improvement with oral medications or PTNS.  Also complains of vaginal dryness.  She did not tolerate vaginal estrogen she reports is irritated her.\par She has had intermittent gross hematuria in the last few months.  She had a cystoscopy with her previous urologist Dr. Posadas over urine half ago.  Denies any flank pain.\par Denies dysuria or associated symptoms. \par \par Ultrasound of the kidneys images visualized from October 2021 demonstrating 1.6 cm stone right lower pole previously 1.3 cm no hydronephrosis bilaterally\par CT abdomen pelvis without contrast June 2020 demonstrates the right lower pole stone my measurement 7 mm approximately 700 Hounsfield unit\par \par Urinalysis with microscopic hematuria August 2021\par Urine culture no growth to date\par \par Denies  PMH including previous kidney stones, recurrent UTIs. \par Family History: No  malignancies\par Social History: Non-smoker\par \par Old notes reviewed with Dr. Hawkins\par PVR 0cc\par

## 2021-11-30 NOTE — PHYSICAL EXAM
[General Appearance - Well Developed] : well developed [General Appearance - Well Nourished] : well nourished [Normal Appearance] : normal appearance [Well Groomed] : well groomed [General Appearance - In No Acute Distress] : no acute distress [Normal Station and Gait] : the gait and station were normal for the patient's age [] : no rash [Oriented To Time, Place, And Person] : oriented to person, place, and time [Affect] : the affect was normal [Mood] : the mood was normal [Not Anxious] : not anxious

## 2021-11-30 NOTE — ASSESSMENT
[FreeTextEntry1] : EMELY SCHULTZ is a 51 year old female who presents for consultation for recurrent UTIs on macrobid suppression, OAB sp PTNS, gross hematuria and consultation for nephrolithiasis.\par \par Given recent gross hematuria and prior microscopic hematuria not in the setting of urinary tract infection I am recommending repeat work-up.\par CT urogram (has not done yet)\par Cystoscopy next visit\par KUB to assess for radiopacity\par Urine studies including cytology\par \par If the stone is visible on KUB will likely favor shockwave lithotripsy depending on the size.\par For the patient's recurrent UTIs I am in agreement with Dr Hawkins.\par Although the stone may be a nidus, typically GUSM is the more common cause . therefore agree w vaginal estrogen

## 2021-12-02 ENCOUNTER — NON-APPOINTMENT (OUTPATIENT)
Age: 51
End: 2021-12-02

## 2021-12-02 LAB
ANION GAP SERPL CALC-SCNC: 15 MMOL/L
BACTERIA UR CULT: NORMAL
BUN SERPL-MCNC: 11 MG/DL
CALCIUM SERPL-MCNC: 9.5 MG/DL
CHLORIDE SERPL-SCNC: 104 MMOL/L
CO2 SERPL-SCNC: 23 MMOL/L
CREAT SERPL-MCNC: 0.8 MG/DL
GLUCOSE SERPL-MCNC: 84 MG/DL
POTASSIUM SERPL-SCNC: 4.4 MMOL/L
SODIUM SERPL-SCNC: 142 MMOL/L

## 2021-12-07 ENCOUNTER — NON-APPOINTMENT (OUTPATIENT)
Age: 51
End: 2021-12-07

## 2021-12-07 LAB — URINE CYTOLOGY: NORMAL

## 2021-12-09 ENCOUNTER — APPOINTMENT (OUTPATIENT)
Dept: UROLOGY | Facility: CLINIC | Age: 51
End: 2021-12-09

## 2021-12-21 ENCOUNTER — RESULT REVIEW (OUTPATIENT)
Age: 51
End: 2021-12-21

## 2021-12-21 ENCOUNTER — OUTPATIENT (OUTPATIENT)
Dept: OUTPATIENT SERVICES | Facility: HOSPITAL | Age: 51
LOS: 1 days | Discharge: HOME | End: 2021-12-21
Payer: MEDICAID

## 2021-12-21 DIAGNOSIS — N20.0 CALCULUS OF KIDNEY: ICD-10-CM

## 2021-12-21 DIAGNOSIS — R31.29 OTHER MICROSCOPIC HEMATURIA: ICD-10-CM

## 2021-12-21 PROCEDURE — 74178 CT ABD&PLV WO CNTR FLWD CNTR: CPT | Mod: 26

## 2021-12-21 PROCEDURE — 74019 RADEX ABDOMEN 2 VIEWS: CPT | Mod: 26

## 2021-12-23 ENCOUNTER — APPOINTMENT (OUTPATIENT)
Dept: UROLOGY | Facility: CLINIC | Age: 51
End: 2021-12-23

## 2021-12-30 ENCOUNTER — APPOINTMENT (OUTPATIENT)
Dept: UROLOGY | Facility: CLINIC | Age: 51
End: 2021-12-30
Payer: MEDICAID

## 2021-12-30 VITALS — HEIGHT: 64 IN | BODY MASS INDEX: 27.83 KG/M2 | WEIGHT: 163 LBS

## 2021-12-30 DIAGNOSIS — R31.0 GROSS HEMATURIA: ICD-10-CM

## 2021-12-30 PROCEDURE — 99214 OFFICE O/P EST MOD 30 MIN: CPT

## 2021-12-30 NOTE — ASSESSMENT
[FreeTextEntry1] : EMELY SCHULTZ is a 51 year old female who presents for consultation for recurrent UTIs on macrobid suppression, OAB sp PTNS, gross hematuria and consultation for nephrolithiasis.\par \par Discussed with the pt the cystoscopy procedure and the risk of bladder cancer and the importance to complete the microhematuria work up. Pt agrees to proceed with the procedure next visit. Declined today \par \par Plan: \par Cystoscopy next visit to complete the microhematuria workup \par UA, UCx, \par ESWL for right renal stone\par For the patient's recurrent UTIs I am in agreement with Dr Hawkins.\par Although the stone may be a nidus, typically GUSM is the more common cause . therefore agree w vaginal estrogen\par Patient is aware that her bladder and pelvic symptoms may not improve with the shockwave lithotripsy.  She also is aware that given the stone is right at the ureteropelvic junction and is large she may be at high risk for ureteral obstruction and require additional procedures such as ureteroscopy\par \par \par Our stone treatment discussion summarized--\par 1. Surveillance: we discussed risks associated with this approach including increase in size of stone, UTIs, renal obstruction.\par \par 2. URS/LL: we discussed how this is done (transurethrally with small cameras, baskets and a laser), the risks (bleeding, infection, damage to  organs, stricture, inability to access the ureter, stent pain which can be mild, moderate or severe) and benefits (minimally invasive). The patient also understands that if our scopes will not fit into the ureter because the ureter is too small, the patient will be stented and left to dilate with a stent ~2 weeks. We will then re-attempt the ureteroscopy. \par \par 3. ESWL: we discussed how this procedure is performed (transcorporeal shock waves under light anesthesia), the risks (bleeding, failure to fragment stones, steinstrasse) and benefits (least invasive technique). \par \par For these treatment, we also discussed the possible need for additional therapies for persistent stone burden. \par We stressed that when ureteral stents are inserted they are temporary and must be removed within 3 months of placement. Otherwise encrustation, urosepsis, obstruction can occur.\par Patient is not on anticoagulation.\par Based on the location and size of the patient's stone burden, I recommended *** and the patient agreed.\par \par

## 2021-12-30 NOTE — ADDENDUM
[FreeTextEntry1] : Patient's note was transcribed with the assistance of a medical scribe under the supervision of Dr. Gustafson.\par I, Dr. Gustafson, have reviewed the patient's chart and agree that it aligns with my medical decisions.\par Ivana Knutson, our scribe, also served as a chaperone for physical examination purposes.\par \par \par

## 2021-12-30 NOTE — HISTORY OF PRESENT ILLNESS
[FreeTextEntry1] : EMELY SCHULTZ is a 51 year old female who presents for consultation for recurrent UTIs on macrobid suppression, OAB sp PTNS, gross hematuria and consultation for nephrolithiasis.\par \par Pt states she is experiencing positional intermittent Rt flank pain radiating to rt pelvic area. \par Pt reports she has been experiencing increased urinary frequency, urgency and reports she is feeling a sensation of '' pressure ''. \par \par 11/2021: \par UCx=ngtd \par UA: proteinuria //blood=large //  RBC= 24 // Leuk Esterase=moderate // WBC= 61 // epithelial cells // Hyaline casts //\par Negative for malignant cells.  \par Cr= 0.8\par eGFR= 84\par \par CTU w/wo iv Contrast images visualized from 12/2021: \par  Mild hydronephrosis secondary to a 1.4 cm renal pelvic calculus (1175 HU). No other urinary tract calculi seen. No left hydronephrosis. No bladder calculi or filling defect. Multiple pelvic phleboliths.\par \par KUB images visualized from 12/2021: 1.4 cm right renal pelvic calculus. No other urinary tract calculi seen. Multiple pelvic phleboliths.\par \par Previously: \par Patient reports severe urinary frequency and urgency for the last few years.  She has not had no improvement with oral medications or PTNS.  Also complains of vaginal dryness.  She did not tolerate vaginal estrogen she reports is irritated her.\par She has had intermittent gross hematuria in the last few months.  She had a cystoscopy with her previous urologist Dr. Posadas over urine half ago.  Denies any flank pain.\par Denies dysuria or associated symptoms. \par \par Ultrasound of the kidneys images visualized from October 2021 demonstrating 1.6 cm stone right lower pole previously 1.3 cm no hydronephrosis bilaterally\par CT abdomen pelvis without contrast June 2020 demonstrates the right lower pole stone my measurement 7 mm approximately 700 Hounsfield unit\par \par Urinalysis with microscopic hematuria August 2021\par Urine culture no growth to date\par \par Denies  PMH including previous kidney stones, recurrent UTIs. \par Family History: No  malignancies\par Social History: Non-smoker\par \par Old notes reviewed with Dr. Hawkins\par PVR 0cc\par

## 2022-01-03 ENCOUNTER — NON-APPOINTMENT (OUTPATIENT)
Age: 52
End: 2022-01-03

## 2022-01-03 LAB — BACTERIA UR CULT: NORMAL

## 2022-01-17 ENCOUNTER — OUTPATIENT (OUTPATIENT)
Dept: OUTPATIENT SERVICES | Facility: HOSPITAL | Age: 52
LOS: 1 days | Discharge: HOME | End: 2022-01-17
Payer: MEDICARE

## 2022-01-17 VITALS
HEIGHT: 63 IN | TEMPERATURE: 97 F | HEART RATE: 97 BPM | RESPIRATION RATE: 16 BRPM | DIASTOLIC BLOOD PRESSURE: 86 MMHG | WEIGHT: 163.14 LBS | OXYGEN SATURATION: 97 % | SYSTOLIC BLOOD PRESSURE: 138 MMHG

## 2022-01-17 DIAGNOSIS — N20.0 CALCULUS OF KIDNEY: ICD-10-CM

## 2022-01-17 DIAGNOSIS — S63.641A SPRAIN OF METACARPOPHALANGEAL JOINT OF RIGHT THUMB, INITIAL ENCOUNTER: Chronic | ICD-10-CM

## 2022-01-17 DIAGNOSIS — Z01.818 ENCOUNTER FOR OTHER PREPROCEDURAL EXAMINATION: ICD-10-CM

## 2022-01-17 LAB
ALBUMIN SERPL ELPH-MCNC: 4.5 G/DL — SIGNIFICANT CHANGE UP (ref 3.5–5.2)
ALP SERPL-CCNC: 105 U/L — SIGNIFICANT CHANGE UP (ref 30–115)
ALT FLD-CCNC: 10 U/L — SIGNIFICANT CHANGE UP (ref 0–41)
ANION GAP SERPL CALC-SCNC: 14 MMOL/L — SIGNIFICANT CHANGE UP (ref 7–14)
APPEARANCE UR: ABNORMAL
AST SERPL-CCNC: 15 U/L — SIGNIFICANT CHANGE UP (ref 0–41)
BASOPHILS # BLD AUTO: 0.06 K/UL — SIGNIFICANT CHANGE UP (ref 0–0.2)
BASOPHILS NFR BLD AUTO: 0.8 % — SIGNIFICANT CHANGE UP (ref 0–1)
BILIRUB SERPL-MCNC: 0.2 MG/DL — SIGNIFICANT CHANGE UP (ref 0.2–1.2)
BILIRUB UR-MCNC: NEGATIVE — SIGNIFICANT CHANGE UP
BUN SERPL-MCNC: 11 MG/DL — SIGNIFICANT CHANGE UP (ref 10–20)
CALCIUM SERPL-MCNC: 9.5 MG/DL — SIGNIFICANT CHANGE UP (ref 8.5–10.1)
CHLORIDE SERPL-SCNC: 104 MMOL/L — SIGNIFICANT CHANGE UP (ref 98–110)
CO2 SERPL-SCNC: 24 MMOL/L — SIGNIFICANT CHANGE UP (ref 17–32)
COLOR SPEC: YELLOW — SIGNIFICANT CHANGE UP
CREAT SERPL-MCNC: 0.8 MG/DL — SIGNIFICANT CHANGE UP (ref 0.7–1.5)
DIFF PNL FLD: ABNORMAL
EOSINOPHIL # BLD AUTO: 0.1 K/UL — SIGNIFICANT CHANGE UP (ref 0–0.7)
EOSINOPHIL NFR BLD AUTO: 1.3 % — SIGNIFICANT CHANGE UP (ref 0–8)
GLUCOSE SERPL-MCNC: 85 MG/DL — SIGNIFICANT CHANGE UP (ref 70–99)
GLUCOSE UR QL: NEGATIVE — SIGNIFICANT CHANGE UP
HCT VFR BLD CALC: 46.7 % — SIGNIFICANT CHANGE UP (ref 37–47)
HGB BLD-MCNC: 15 G/DL — SIGNIFICANT CHANGE UP (ref 12–16)
IMM GRANULOCYTES NFR BLD AUTO: 0.3 % — SIGNIFICANT CHANGE UP (ref 0.1–0.3)
KETONES UR-MCNC: NEGATIVE — SIGNIFICANT CHANGE UP
LEUKOCYTE ESTERASE UR-ACNC: ABNORMAL
LYMPHOCYTES # BLD AUTO: 2.62 K/UL — SIGNIFICANT CHANGE UP (ref 1.2–3.4)
LYMPHOCYTES # BLD AUTO: 33.4 % — SIGNIFICANT CHANGE UP (ref 20.5–51.1)
MCHC RBC-ENTMCNC: 30 PG — SIGNIFICANT CHANGE UP (ref 27–31)
MCHC RBC-ENTMCNC: 32.1 G/DL — SIGNIFICANT CHANGE UP (ref 32–37)
MCV RBC AUTO: 93.4 FL — SIGNIFICANT CHANGE UP (ref 81–99)
MONOCYTES # BLD AUTO: 0.57 K/UL — SIGNIFICANT CHANGE UP (ref 0.1–0.6)
MONOCYTES NFR BLD AUTO: 7.3 % — SIGNIFICANT CHANGE UP (ref 1.7–9.3)
NEUTROPHILS # BLD AUTO: 4.47 K/UL — SIGNIFICANT CHANGE UP (ref 1.4–6.5)
NEUTROPHILS NFR BLD AUTO: 56.9 % — SIGNIFICANT CHANGE UP (ref 42.2–75.2)
NITRITE UR-MCNC: NEGATIVE — SIGNIFICANT CHANGE UP
NRBC # BLD: 0 /100 WBCS — SIGNIFICANT CHANGE UP (ref 0–0)
PH UR: 6 — SIGNIFICANT CHANGE UP (ref 5–8)
PLATELET # BLD AUTO: 330 K/UL — SIGNIFICANT CHANGE UP (ref 130–400)
POTASSIUM SERPL-MCNC: 4.5 MMOL/L — SIGNIFICANT CHANGE UP (ref 3.5–5)
POTASSIUM SERPL-SCNC: 4.5 MMOL/L — SIGNIFICANT CHANGE UP (ref 3.5–5)
PROT SERPL-MCNC: 7.4 G/DL — SIGNIFICANT CHANGE UP (ref 6–8)
PROT UR-MCNC: ABNORMAL
RBC # BLD: 5 M/UL — SIGNIFICANT CHANGE UP (ref 4.2–5.4)
RBC # FLD: 11.9 % — SIGNIFICANT CHANGE UP (ref 11.5–14.5)
SODIUM SERPL-SCNC: 142 MMOL/L — SIGNIFICANT CHANGE UP (ref 135–146)
SP GR SPEC: 1.03 — SIGNIFICANT CHANGE UP (ref 1.01–1.03)
UROBILINOGEN FLD QL: ABNORMAL
WBC # BLD: 7.84 K/UL — SIGNIFICANT CHANGE UP (ref 4.8–10.8)
WBC # FLD AUTO: 7.84 K/UL — SIGNIFICANT CHANGE UP (ref 4.8–10.8)

## 2022-01-17 PROCEDURE — 93010 ELECTROCARDIOGRAM REPORT: CPT

## 2022-01-17 NOTE — H&P PST ADULT - OTHER CARE PROVIDERS
Cardio: dr. Vesta Quezada Cardio: dr. Vesta Bolden 04/2021 LV  pt seen for heart palpitations- Per Pt had workup and found to be anxiety, no cardiac issues

## 2022-01-17 NOTE — H&P PST ADULT - REASON FOR ADMISSION
51 yr old female AOOx3 wit intermittent complaint of RT flank pain radiating to pelvic area.. with increased urinary frequency, pressure. Pt seen by Urology. PT dent for CT. PT found to have hydronephrosis, 1.4cm renal pelvic calculus. PT electing to have RIght extracorporeal  shock wave lithrotripsy with Dr. Gustafson 02/02/22

## 2022-01-17 NOTE — H&P PST ADULT - NSANTHOSAYNRD_GEN_A_CORE
No. BERTHA screening performed.  STOP BANG Legend: 0-2 = LOW Risk; 3-4 = INTERMEDIATE Risk; 5-8 = HIGH Risk

## 2022-01-17 NOTE — H&P PST ADULT - NSICDXFAMILYHX_GEN_ALL_CORE_FT
FAMILY HISTORY:  Father  Still living? No  Family history of leukemia, Age at diagnosis: Age Unknown    Mother  Still living? No  FH: lung cancer, Age at diagnosis: Age Unknown

## 2022-01-17 NOTE — H&P PST ADULT - HISTORY OF PRESENT ILLNESS
51 yr old female AOOx3 wit intermittent complaint of RT flank pain radiating to pelvic area.. with increased urinary frequency, pressure. Pt seen by Urology. PT dent for CT. PT found to have hydronephrosis, 1.4cm renal pelvic calculus. PT electing to have RIght extracorporeal  shock wave lithrotripsy  PT denies any CP, SOB, palpitations. Pt has some dysuria  PT states she can climb 1 FOS with no SOB  Anesthesia Alert  NO--Difficult Airway  NO--History of neck surgery or radiation  NO--Limited ROM of neck  NO--History of Malignant hyperthermia  NO--No personal or family history of Pseudocholinesterase deficiency.  NO--Prior Anesthesia Complication  NO--Latex Allergy  NO--Loose teeth  NO--History of Rheumatoid Arthritis  NO--BERTHA  NO Bleeding disorder  NO--Other_____  PT is aware she has a COVID 19 appointment on 01/30/22 Sunday at 10:10am at [facility]  PT denied any exposure to COVID 19 recently. Pt is aware to quarantine before surgery

## 2022-01-18 LAB
CULTURE RESULTS: SIGNIFICANT CHANGE UP
SPECIMEN SOURCE: SIGNIFICANT CHANGE UP

## 2022-01-27 ENCOUNTER — APPOINTMENT (OUTPATIENT)
Dept: UROLOGY | Facility: CLINIC | Age: 52
End: 2022-01-27

## 2022-02-08 PROBLEM — N20.0 CALCULUS OF KIDNEY: Chronic | Status: ACTIVE | Noted: 2022-01-17

## 2022-02-09 ENCOUNTER — NON-APPOINTMENT (OUTPATIENT)
Age: 52
End: 2022-02-09

## 2022-02-09 RX ORDER — CIPROFLOXACIN HYDROCHLORIDE 500 MG/1
500 TABLET, FILM COATED ORAL
Qty: 14 | Refills: 0 | Status: COMPLETED | COMMUNITY
Start: 2022-02-09 | End: 2022-02-16

## 2022-02-14 ENCOUNTER — OUTPATIENT (OUTPATIENT)
Dept: OUTPATIENT SERVICES | Facility: HOSPITAL | Age: 52
LOS: 1 days | Discharge: HOME | End: 2022-02-14
Payer: MEDICAID

## 2022-02-14 VITALS
RESPIRATION RATE: 16 BRPM | SYSTOLIC BLOOD PRESSURE: 142 MMHG | TEMPERATURE: 99 F | WEIGHT: 164.91 LBS | OXYGEN SATURATION: 99 % | DIASTOLIC BLOOD PRESSURE: 67 MMHG | HEART RATE: 71 BPM | HEIGHT: 64 IN

## 2022-02-14 DIAGNOSIS — Z01.818 ENCOUNTER FOR OTHER PREPROCEDURAL EXAMINATION: ICD-10-CM

## 2022-02-14 DIAGNOSIS — N20.0 CALCULUS OF KIDNEY: ICD-10-CM

## 2022-02-14 DIAGNOSIS — S63.641A SPRAIN OF METACARPOPHALANGEAL JOINT OF RIGHT THUMB, INITIAL ENCOUNTER: Chronic | ICD-10-CM

## 2022-02-14 LAB
ALBUMIN SERPL ELPH-MCNC: 4.7 G/DL — SIGNIFICANT CHANGE UP (ref 3.5–5.2)
ALP SERPL-CCNC: 105 U/L — SIGNIFICANT CHANGE UP (ref 30–115)
ALT FLD-CCNC: 14 U/L — SIGNIFICANT CHANGE UP (ref 0–41)
ANION GAP SERPL CALC-SCNC: 18 MMOL/L — HIGH (ref 7–14)
APPEARANCE UR: ABNORMAL
APTT BLD: 29.1 SEC — SIGNIFICANT CHANGE UP (ref 27–39.2)
AST SERPL-CCNC: 16 U/L — SIGNIFICANT CHANGE UP (ref 0–41)
BACTERIA # UR AUTO: ABNORMAL
BASOPHILS # BLD AUTO: 0.07 K/UL — SIGNIFICANT CHANGE UP (ref 0–0.2)
BASOPHILS NFR BLD AUTO: 0.8 % — SIGNIFICANT CHANGE UP (ref 0–1)
BILIRUB SERPL-MCNC: 0.3 MG/DL — SIGNIFICANT CHANGE UP (ref 0.2–1.2)
BILIRUB UR-MCNC: NEGATIVE — SIGNIFICANT CHANGE UP
BUN SERPL-MCNC: 8 MG/DL — LOW (ref 10–20)
CALCIUM SERPL-MCNC: 9.7 MG/DL — SIGNIFICANT CHANGE UP (ref 8.5–10.1)
CHLORIDE SERPL-SCNC: 102 MMOL/L — SIGNIFICANT CHANGE UP (ref 98–110)
CO2 SERPL-SCNC: 23 MMOL/L — SIGNIFICANT CHANGE UP (ref 17–32)
COLOR SPEC: YELLOW — SIGNIFICANT CHANGE UP
CREAT SERPL-MCNC: 0.8 MG/DL — SIGNIFICANT CHANGE UP (ref 0.7–1.5)
DIFF PNL FLD: ABNORMAL
EOSINOPHIL # BLD AUTO: 0.14 K/UL — SIGNIFICANT CHANGE UP (ref 0–0.7)
EOSINOPHIL NFR BLD AUTO: 1.6 % — SIGNIFICANT CHANGE UP (ref 0–8)
EPI CELLS # UR: 20 /HPF — HIGH (ref 0–5)
GLUCOSE SERPL-MCNC: 77 MG/DL — SIGNIFICANT CHANGE UP (ref 70–99)
GLUCOSE UR QL: NEGATIVE — SIGNIFICANT CHANGE UP
HCT VFR BLD CALC: 44.7 % — SIGNIFICANT CHANGE UP (ref 37–47)
HGB BLD-MCNC: 14.1 G/DL — SIGNIFICANT CHANGE UP (ref 12–16)
HYALINE CASTS # UR AUTO: 14 /LPF — HIGH (ref 0–7)
IMM GRANULOCYTES NFR BLD AUTO: 0.3 % — SIGNIFICANT CHANGE UP (ref 0.1–0.3)
INR BLD: 0.9 RATIO — SIGNIFICANT CHANGE UP (ref 0.65–1.3)
KETONES UR-MCNC: NEGATIVE — SIGNIFICANT CHANGE UP
LEUKOCYTE ESTERASE UR-ACNC: ABNORMAL
LYMPHOCYTES # BLD AUTO: 3 K/UL — SIGNIFICANT CHANGE UP (ref 1.2–3.4)
LYMPHOCYTES # BLD AUTO: 34.5 % — SIGNIFICANT CHANGE UP (ref 20.5–51.1)
MCHC RBC-ENTMCNC: 30.1 PG — SIGNIFICANT CHANGE UP (ref 27–31)
MCHC RBC-ENTMCNC: 31.5 G/DL — LOW (ref 32–37)
MCV RBC AUTO: 95.5 FL — SIGNIFICANT CHANGE UP (ref 81–99)
MONOCYTES # BLD AUTO: 0.74 K/UL — HIGH (ref 0.1–0.6)
MONOCYTES NFR BLD AUTO: 8.5 % — SIGNIFICANT CHANGE UP (ref 1.7–9.3)
NEUTROPHILS # BLD AUTO: 4.71 K/UL — SIGNIFICANT CHANGE UP (ref 1.4–6.5)
NEUTROPHILS NFR BLD AUTO: 54.3 % — SIGNIFICANT CHANGE UP (ref 42.2–75.2)
NITRITE UR-MCNC: NEGATIVE — SIGNIFICANT CHANGE UP
NRBC # BLD: 0 /100 WBCS — SIGNIFICANT CHANGE UP (ref 0–0)
PH UR: 6.5 — SIGNIFICANT CHANGE UP (ref 5–8)
PLATELET # BLD AUTO: 329 K/UL — SIGNIFICANT CHANGE UP (ref 130–400)
POTASSIUM SERPL-MCNC: 4.4 MMOL/L — SIGNIFICANT CHANGE UP (ref 3.5–5)
POTASSIUM SERPL-SCNC: 4.4 MMOL/L — SIGNIFICANT CHANGE UP (ref 3.5–5)
PROT SERPL-MCNC: 7.2 G/DL — SIGNIFICANT CHANGE UP (ref 6–8)
PROT UR-MCNC: ABNORMAL
PROTHROM AB SERPL-ACNC: 10.4 SEC — SIGNIFICANT CHANGE UP (ref 9.95–12.87)
RBC # BLD: 4.68 M/UL — SIGNIFICANT CHANGE UP (ref 4.2–5.4)
RBC # FLD: 12.2 % — SIGNIFICANT CHANGE UP (ref 11.5–14.5)
RBC CASTS # UR COMP ASSIST: >720 /HPF — HIGH (ref 0–4)
SODIUM SERPL-SCNC: 143 MMOL/L — SIGNIFICANT CHANGE UP (ref 135–146)
SP GR SPEC: 1.02 — SIGNIFICANT CHANGE UP (ref 1.01–1.03)
UROBILINOGEN FLD QL: SIGNIFICANT CHANGE UP
WBC # BLD: 8.69 K/UL — SIGNIFICANT CHANGE UP (ref 4.8–10.8)
WBC # FLD AUTO: 8.69 K/UL — SIGNIFICANT CHANGE UP (ref 4.8–10.8)
WBC UR QL: 77 /HPF — HIGH (ref 0–5)

## 2022-02-14 PROCEDURE — 93010 ELECTROCARDIOGRAM REPORT: CPT

## 2022-02-14 NOTE — H&P PST ADULT - HEIGHT IN FEET
Received bedside report from [facility] OSBALDO.        Jes, [address] [place] [address]  08/15/20 [id number]

## 2022-02-14 NOTE — H&P PST ADULT - REASON FOR ADMISSION
Patient is a  52    year old female presenting to PAST in preparation for   right extracorporeal shock wave lithotripsy  on    3/2/22   under lsb   anesthesia by Dr. Gustafson.  Pt reports-- I have kidney stones.  I have it for about 1.5 years.  the pain is a 9/10.  it's a pressure, pulling and burning pain.  I have to take motrin for the pain to go away.

## 2022-02-14 NOTE — H&P PST ADULT - HISTORY OF PRESENT ILLNESS
PT PRESENTS TO PAST WITH NO SOB, CP, PALPITATIONS, DYSURIA, OR URI AT PRESENT.    I HAVE A UTI- CURRENTLY. I AM TAKING MACROBID FOR 4 MONTHS.    PT ABLE TO WALK UP 2-3 FLIGHTS OF STEPS WITH NO SOB.  AS PER THE PT, THIS IS HIS/HER COMPLETE MEDICAL AND SURGICAL HX, INCLUDING MEDICATIONS PRESCRIBED AND OVER THE COUNTER  pt denies any covid s/s, or tested positive in the past- PT IS AWARE OF DATE AND TIME OF COVID TESTING PRIOR TO SURGERY.   pt advised self quarantine till day of procedure  denies travel outside the [place] in the past 30 days  Anesthesia Alert  NO--Difficult Airway  NO--History of neck surgery or radiation  NO--Limited ROM of neck  NO--History of Malignant hyperthermia  NO--Personal or family history of Pseudocholinesterase deficiency  NO--Prior Anesthesia Complication  NO--Latex Allergy  NO--Loose teeth  NO--History of Rheumatoid Arthritis  NO--BERTHA  NO BLEEDING RISK  NO--Other_____

## 2022-02-16 LAB
CULTURE RESULTS: SIGNIFICANT CHANGE UP
SPECIMEN SOURCE: SIGNIFICANT CHANGE UP

## 2022-02-27 ENCOUNTER — LABORATORY RESULT (OUTPATIENT)
Age: 52
End: 2022-02-27

## 2022-03-02 ENCOUNTER — OUTPATIENT (OUTPATIENT)
Dept: OUTPATIENT SERVICES | Facility: HOSPITAL | Age: 52
LOS: 1 days | Discharge: HOME | End: 2022-03-02
Payer: MEDICARE

## 2022-03-02 ENCOUNTER — APPOINTMENT (OUTPATIENT)
Dept: UROLOGY | Facility: HOSPITAL | Age: 52
End: 2022-03-02

## 2022-03-02 VITALS
WEIGHT: 164.02 LBS | DIASTOLIC BLOOD PRESSURE: 99 MMHG | SYSTOLIC BLOOD PRESSURE: 126 MMHG | HEIGHT: 64 IN | TEMPERATURE: 98 F | OXYGEN SATURATION: 100 % | HEART RATE: 96 BPM | RESPIRATION RATE: 18 BRPM

## 2022-03-02 VITALS — HEART RATE: 83 BPM | RESPIRATION RATE: 16 BRPM | DIASTOLIC BLOOD PRESSURE: 76 MMHG | SYSTOLIC BLOOD PRESSURE: 114 MMHG

## 2022-03-02 DIAGNOSIS — Z87.828 PERSONAL HISTORY OF OTHER (HEALED) PHYSICAL INJURY AND TRAUMA: Chronic | ICD-10-CM

## 2022-03-02 DIAGNOSIS — S63.641A SPRAIN OF METACARPOPHALANGEAL JOINT OF RIGHT THUMB, INITIAL ENCOUNTER: Chronic | ICD-10-CM

## 2022-03-02 PROBLEM — M19.90 UNSPECIFIED OSTEOARTHRITIS, UNSPECIFIED SITE: Chronic | Status: ACTIVE | Noted: 2022-02-14

## 2022-03-02 PROCEDURE — 50590 FRAGMENTING OF KIDNEY STONE: CPT | Mod: RT

## 2022-03-02 RX ORDER — NITROFURANTOIN MACROCRYSTAL 50 MG
1 CAPSULE ORAL
Qty: 0 | Refills: 0 | DISCHARGE

## 2022-03-02 RX ORDER — ONDANSETRON 8 MG/1
4 TABLET, FILM COATED ORAL ONCE
Refills: 0 | Status: COMPLETED | OUTPATIENT
Start: 2022-03-02 | End: 2022-03-02

## 2022-03-02 RX ORDER — ACETAMINOPHEN 500 MG
2 TABLET ORAL
Qty: 0 | Refills: 0 | DISCHARGE

## 2022-03-02 RX ORDER — SODIUM CHLORIDE 9 MG/ML
1000 INJECTION, SOLUTION INTRAVENOUS
Refills: 0 | Status: DISCONTINUED | OUTPATIENT
Start: 2022-03-02 | End: 2022-03-02

## 2022-03-02 RX ORDER — CYCLOBENZAPRINE HYDROCHLORIDE 10 MG/1
1 TABLET, FILM COATED ORAL
Qty: 0 | Refills: 0 | DISCHARGE

## 2022-03-02 RX ORDER — IBUPROFEN 200 MG
1 TABLET ORAL
Qty: 0 | Refills: 0 | DISCHARGE

## 2022-03-02 RX ORDER — MORPHINE SULFATE 50 MG/1
4 CAPSULE, EXTENDED RELEASE ORAL ONCE
Refills: 0 | Status: DISCONTINUED | OUTPATIENT
Start: 2022-03-02 | End: 2022-03-02

## 2022-03-02 RX ADMIN — ONDANSETRON 4 MILLIGRAM(S): 8 TABLET, FILM COATED ORAL at 10:18

## 2022-03-02 NOTE — ASU DISCHARGE PLAN (ADULT/PEDIATRIC) - NS MD DC FALL RISK RISK
For information on Fall & Injury Prevention, visit: [email or web address] OR  [email or web address] OR  https://www.cdc.gov/steadi/patient.html

## 2022-03-02 NOTE — ASU DISCHARGE PLAN (ADULT/PEDIATRIC) - ASU DC SPECIAL INSTRUCTIONSFT
Please take flomax for the next month ( at pharmacy) to help pass stones. Tylenol should be sufficient as needed for pain. Drinks approximately 3 liters of water every day. If you develop fevers/chills >100.4, visit emergency room immediately and inform Dr Gustafson's office.  The office will call you for a follow up appointment with Dr Gustafson. Please obtain an xray a few days before your appointment which the office will arrange.  You will be given a strainer to collect stones with urination. Save any stones and bring them to your next appointment.

## 2022-03-02 NOTE — ASU PATIENT PROFILE, ADULT - NSICDXPASTSURGICALHX_GEN_ALL_CORE_FT
PAST SURGICAL HISTORY:  History of torn meniscus of knee left knee    Rupture of ulnar collateral ligament of right thumb 2014

## 2022-03-02 NOTE — CHART NOTE - NSCHARTNOTEFT_GEN_A_CORE
PACU ANESTHESIA ADMISSION NOTE      Procedure: Extracorporeal shockwave lithotripsy (ESWL)      Post op diagnosis:      ____  Intubated  TV:______       Rate: ______      FiO2: ______    _x___  Patent Airway    _x___  Full return of protective reflexes    _x___  Full recovery from anesthesia / back to baseline status    Vitals:  T(C): 36.4 (03-02-22 @ 08:49), Max: 36.4 (03-02-22 @ 06:44)  HR: 89 (03-02-22 @ 09:04) (87 - 97)  BP: 128/82 (03-02-22 @ 08:59) (118/81 - 128/82)  RR: 10 (03-02-22 @ 08:59) (10 - 20)  SpO2: 100% (03-02-22 @ 08:59) (100% - 100%)    Mental Status:  _x___ Awake   _____ Alert   _____ Drowsy   _____ Sedated    Nausea/Vomiting:  _x___  NO       ______Yes,   See Post - Op Orders         Pain Scale (0-10):  __0___    Treatment: _x___ None    ____ See Post - Op/PCA Orders    Post - Operative Fluids:   __x__ Oral   ____ See Post - Op Orders    Plan: Discharge:   _x___Home       _____Floor     _____Critical Care    _____  Other:_________________    Comments:  No anesthesia issues or complications noted.  Discharge when criteria met.

## 2022-03-02 NOTE — ASU PATIENT PROFILE, ADULT - FALL HARM RISK - HARM RISK INTERVENTIONS

## 2022-03-07 DIAGNOSIS — M19.90 UNSPECIFIED OSTEOARTHRITIS, UNSPECIFIED SITE: ICD-10-CM

## 2022-03-07 DIAGNOSIS — N20.0 CALCULUS OF KIDNEY: ICD-10-CM

## 2022-03-10 ENCOUNTER — TRANSCRIPTION ENCOUNTER (OUTPATIENT)
Age: 52
End: 2022-03-10

## 2022-03-18 ENCOUNTER — OUTPATIENT (OUTPATIENT)
Dept: OUTPATIENT SERVICES | Facility: HOSPITAL | Age: 52
LOS: 1 days | Discharge: HOME | End: 2022-03-18
Payer: MEDICAID

## 2022-03-18 DIAGNOSIS — N20.0 CALCULUS OF KIDNEY: ICD-10-CM

## 2022-03-18 DIAGNOSIS — Z87.828 PERSONAL HISTORY OF OTHER (HEALED) PHYSICAL INJURY AND TRAUMA: Chronic | ICD-10-CM

## 2022-03-18 DIAGNOSIS — S63.641A SPRAIN OF METACARPOPHALANGEAL JOINT OF RIGHT THUMB, INITIAL ENCOUNTER: Chronic | ICD-10-CM

## 2022-03-18 PROCEDURE — 74018 RADEX ABDOMEN 1 VIEW: CPT | Mod: 26

## 2022-03-21 ENCOUNTER — LABORATORY RESULT (OUTPATIENT)
Age: 52
End: 2022-03-21

## 2022-03-22 ENCOUNTER — APPOINTMENT (OUTPATIENT)
Dept: UROLOGY | Facility: CLINIC | Age: 52
End: 2022-03-22
Payer: MEDICAID

## 2022-03-22 PROCEDURE — 99214 OFFICE O/P EST MOD 30 MIN: CPT | Mod: 24

## 2022-03-22 NOTE — HISTORY OF PRESENT ILLNESS
[FreeTextEntry1] : EMELY SCHULTZ is a 51 year old female who presents for consultation for recurrent UTIs on macrobid suppression, OAB sp PTNS, gross hematuria and consultation for right nephrolithiasis sp R ESWL 3/2022 stone successfully fragmented. First stone episode\par \par Patient reports that the flank pain and pelvic pressure has resolved.  Does have persistent urinary frequency.\par Overall significant symptom improvement post procedure\par \par KUB images visualized March 2022 demonstrating complete clearance of right stone burden\par Preop kub 1.4 cm\par \par Previously\par 11/2021: \par UCx=ngtd \par UA: proteinuria //blood=large //  RBC= 24 // Leuk Esterase=moderate // WBC= 61 // epithelial cells // Hyaline casts //\par Negative for malignant cells.  \par Cr= 0.8\par eGFR= 84\par \par CTU w/wo iv Contrast images from 12/2021: \par  Mild hydronephrosis secondary to a 1.4 cm renal pelvic calculus (1175 HU). No other urinary tract calculi seen. No left hydronephrosis. No bladder calculi or filling defect. Multiple pelvic phleboliths.\par \par KUB images visualized from 12/2021: 1.4 cm right renal pelvic calculus. No other urinary tract calculi seen. Multiple pelvic phleboliths.\par \par Previously: \par Patient reports severe urinary frequency and urgency for the last few years.  She has not had no improvement with oral medications or PTNS.  Also complains of vaginal dryness.  She did not tolerate vaginal estrogen she reports is irritated her.\par She has had intermittent gross hematuria in the last few months.  She had a cystoscopy with her previous urologist Dr. Posadas over urine half ago.  Denies any flank pain.\par Denies dysuria or associated symptoms. \par \par Urinalysis with microscopic hematuria August 2021\par Urine culture no growth to date\par \par Denies  PMH including previous kidney stones, recurrent UTIs. \par Family History: No  malignancies\par Social History: Non-smoker\par \par Old notes reviewed with Dr. Hawkins\par PVR 0cc\par

## 2022-03-22 NOTE — ASSESSMENT
[FreeTextEntry1] : EMELY SCHULTZ is a 51 year old female who presents for consultation for recurrent UTIs on macrobid suppression, OAB sp PTNS, gross hematuria and consultation for right nephrolithiasis sp R ESWL 3/2022 stone successfully fragmented. First stone episode\par \par Discussed with the pt the cystoscopy procedure and the risk of bladder cancer and the importance to complete the microhematuria work up. Declined today however now that the stones are gone she is agreeable to performing CYSTO\par Repeat urinalysis today. IF MICROHEME NEEDS CYSTO\par \par For the patient's recurrent UTIs I am in agreement with Dr Hawkins.\par Although the stone may be a nidus, typically GUSM is the more common cause . therefore agree w vaginal estrogen\par fu 1 year if no microheme. in a few weeks if needs cysto\par stone nutritional prevention\par \par \par Stone nutritional counseling given--First and foremost, the most important recommendation is to increase water intake. I have recommended that she drink enough water to produce 2.5L of urine per day. More easily put, I have recommended the patient consume enough water to keep Her urine clear. I have also recommended adding crystal light (or lemon) which contains citrate which prevents stone formation. I have also stressed the importance of minimizing salt and animal protein in the diet.\par

## 2022-03-24 ENCOUNTER — NON-APPOINTMENT (OUTPATIENT)
Age: 52
End: 2022-03-24

## 2022-03-31 ENCOUNTER — NON-APPOINTMENT (OUTPATIENT)
Age: 52
End: 2022-03-31

## 2022-04-26 ENCOUNTER — RX RENEWAL (OUTPATIENT)
Age: 52
End: 2022-04-26

## 2022-05-04 ENCOUNTER — APPOINTMENT (OUTPATIENT)
Dept: UROGYNECOLOGY | Facility: CLINIC | Age: 52
End: 2022-05-04
Payer: MEDICAID

## 2022-05-04 ENCOUNTER — OUTPATIENT (OUTPATIENT)
Dept: OUTPATIENT SERVICES | Facility: HOSPITAL | Age: 52
LOS: 1 days | Discharge: HOME | End: 2022-05-04

## 2022-05-04 VITALS — BODY MASS INDEX: 27.64 KG/M2 | SYSTOLIC BLOOD PRESSURE: 110 MMHG | DIASTOLIC BLOOD PRESSURE: 60 MMHG | WEIGHT: 161 LBS

## 2022-05-04 DIAGNOSIS — S63.641A SPRAIN OF METACARPOPHALANGEAL JOINT OF RIGHT THUMB, INITIAL ENCOUNTER: Chronic | ICD-10-CM

## 2022-05-04 DIAGNOSIS — Z87.828 PERSONAL HISTORY OF OTHER (HEALED) PHYSICAL INJURY AND TRAUMA: Chronic | ICD-10-CM

## 2022-05-04 PROCEDURE — 99214 OFFICE O/P EST MOD 30 MIN: CPT

## 2022-05-04 RX ORDER — TAMSULOSIN HYDROCHLORIDE 0.4 MG/1
0.4 CAPSULE ORAL
Qty: 30 | Refills: 0 | Status: DISCONTINUED | COMMUNITY
Start: 2022-03-02 | End: 2022-05-04

## 2022-05-04 RX ORDER — NITROFURANTOIN (MONOHYDRATE/MACROCRYSTALS) 25; 75 MG/1; MG/1
100 CAPSULE ORAL DAILY
Qty: 30 | Refills: 2 | Status: DISCONTINUED | COMMUNITY
Start: 2021-09-14 | End: 2022-05-04

## 2022-05-04 RX ORDER — IBUPROFEN 800 MG/1
TABLET, FILM COATED ORAL
Refills: 0 | Status: ACTIVE | COMMUNITY

## 2022-05-04 RX ORDER — CYCLOBENZAPRINE HCL 10 MG
10 TABLET ORAL
Refills: 0 | Status: ACTIVE | COMMUNITY

## 2022-05-04 NOTE — HISTORY OF PRESENT ILLNESS
[FreeTextEntry1] : \par The patient is here for a 6 month follow up for her recurrent UTI\par Last seen on 11/3/2021 for followup for her UTIs\par s/p macrobid daily suppression x 6 months (last dose in March 2022)\par Stopped the estrace cream because of burning - was using daily, then every other day\par \par Painful bladder spams- resolved after she was started on macrobid suppression so she self discontinued the prescribed gabapentin 100mg twice a day\par \par Urinary urgency-\par s/p myrbetriq 50mg no change\par s/p oxybutynin no change\par s/p detrol no change\par s/p PTNS, 8 sessions only, no change\par s/p trospium 20mg twice a day, took 1 tab and caused incomplete bladder emptying, PVR: 210cc\par Patient then reported that she had improvement of her urinary urgency when being on the suppression\par \par Today, the patient reports:\par Was doing much better. Has been increasing water intake on advice of Dr. Gustafson which means more frequency (but tolerable). Nocturia 1-2x/night. However since Sunday has had dramatic increase in frequency and urgency. \par \par Using Stout's coconut oil for vaginal dryness (on recommendation of GYN) with some relief. \par Using flexeril for neck and back pain PRN. \par

## 2022-05-04 NOTE — DISCUSSION/SUMMARY
[FreeTextEntry1] : \par Recurrent UTI-\par Advised to restart the estrogen vaginal cream three nights per week to help prevent UTI\par Will empirically treat today with macrobid 100mg twice daily for 7 days\par Will follow up on the urine culture results\par If the patient continues to have recurrent UTI, will then recommend restarting macrobid 100mg daily suppression\par \par Urinary urgency-\par Was doing well without medications until this weekend.\par If no improvement after treatment of UTI will then consider restarting detrol

## 2022-05-04 NOTE — COUNSELING
[FreeTextEntry1] : \par We will notify you of the urine results\par \par Please start the macrobid twice a day for 7 days\par \par Please continue to apply the coconut oil for the dryness\par \par Please start the estrogen cream again three nights per week (a pea size amount to the opening of the vagina), to help prevent infections\par \par Please call the office if you feel like you have an infection so that we can arrange testing of your urine. If you keep getting infections then I will recommend restarting the low dose macrobid every day\par \par Schedule 6 month followup with Dr Hawkins (ABBY)

## 2022-05-10 LAB — BACTERIA UR CULT: NORMAL

## 2022-05-11 ENCOUNTER — NON-APPOINTMENT (OUTPATIENT)
Age: 52
End: 2022-05-11

## 2022-05-11 RX ORDER — TOLTERODINE TARTRATE 4 MG/1
4 CAPSULE, EXTENDED RELEASE ORAL
Qty: 30 | Refills: 1 | Status: ACTIVE | COMMUNITY
Start: 2022-05-11 | End: 1900-01-01

## 2022-06-30 ENCOUNTER — APPOINTMENT (OUTPATIENT)
Dept: UROGYNECOLOGY | Facility: CLINIC | Age: 52
End: 2022-06-30

## 2022-06-30 VITALS
DIASTOLIC BLOOD PRESSURE: 88 MMHG | SYSTOLIC BLOOD PRESSURE: 120 MMHG | BODY MASS INDEX: 27.66 KG/M2 | WEIGHT: 162 LBS | HEART RATE: 90 BPM | HEIGHT: 64 IN

## 2022-06-30 DIAGNOSIS — Z87.442 PERSONAL HISTORY OF URINARY CALCULI: ICD-10-CM

## 2022-06-30 PROCEDURE — 99215 OFFICE O/P EST HI 40 MIN: CPT

## 2022-06-30 RX ORDER — MIRABEGRON 50 MG/1
50 TABLET, FILM COATED, EXTENDED RELEASE ORAL
Qty: 30 | Refills: 1 | Status: ACTIVE | COMMUNITY
Start: 2022-06-30 | End: 1900-01-01

## 2022-06-30 RX ORDER — NITROFURANTOIN (MONOHYDRATE/MACROCRYSTALS) 25; 75 MG/1; MG/1
100 CAPSULE ORAL TWICE DAILY
Qty: 14 | Refills: 0 | Status: DISCONTINUED | COMMUNITY
Start: 2022-05-04 | End: 2022-06-30

## 2022-07-02 LAB — BACTERIA UR CULT: NORMAL

## 2022-07-02 NOTE — COUNSELING
[FreeTextEntry1] : \par We will notify you of the urine results\par \par Please start the myrbetriq 50mg daily.\par \par Please call my office if you have any issues with the cost or side effects of the medication.\par \par Referral to pelvic floor physical therapy\par \par [facility]\par [address] [place] [address]\par Phone: [phone]\par \par At 6 weeks, my office staff will call you to see how you are doing with the medication. If you are happy, we will continue with it and give you refills and if you are not happy we can increase the dosage or change the treatment plan. \par \par Based on your symptoms, we will then determine your next follow up visit timing.\par \par

## 2022-07-02 NOTE — HISTORY OF PRESENT ILLNESS
[FreeTextEntry1] : \par The patient is here for a followup for her urinary urgency\par Last seen on 5/4/2022 for followup for recurrent UTI\par s/p macrobid suppression for 6 months\par self d/george estrace cream because it caused burning\par \par Painful bladder syndrome- symptoms improved with macrobid suppression, self d/george gabapentin 100mg twice a day\par \par Urinary urgency:\par s/p myrbetriq 50mg no change\par s/p oxybutynin no change\par s/p detrol no change\par s/p PTNS, 8 sessions only, no change\par s/p trospium 20mg twice a day, took 1 tab and caused incomplete bladder emptying, PVR: 210cc\par Patient then reported that she had improvement of her urinary urgency when being on the suppression without being on any other medications\par Then called and reported worsening urgency and frequency and restarted the detrol ER 4mg but no change in symptoms\par \par Can consider botox or interstim placement\par

## 2022-07-02 NOTE — DISCUSSION/SUMMARY
[FreeTextEntry1] : \par Urinary urgency-\par We discussed pelvic floor muscle rehabilitation, sacral neuromodulation (Interstim device), and intravesical Botox A. The risks and benefits of all were reviewed and the patient voiced understanding and agrees with a referral to PT and further medical management. She said that she had some improvement with myrbetriq 50mg and wants to restart it.\par \par

## 2022-07-14 ENCOUNTER — RX RENEWAL (OUTPATIENT)
Age: 52
End: 2022-07-14

## 2022-08-09 ENCOUNTER — NON-APPOINTMENT (OUTPATIENT)
Age: 52
End: 2022-08-09

## 2022-09-09 NOTE — ASU DISCHARGE PLAN (ADULT/PEDIATRIC) - ASU DISCHARGE DATE/TIME
NP calling for asap apt for rash, informed apts not until end of oct in [facility], pts father to call around for earlier apt if nothing found wcb to schedule  02-Mar-2022 10:12 02-Mar-2022 10:41

## 2022-09-14 NOTE — ASU DISCHARGE PLAN (ADULT/PEDIATRIC) - DATE OF LAST VACCINATION
Initial Anesthesia Post-op Note    Patient: Ashley Richey  Procedure(s) Performed: TAKEDOWN OF LOOP ILEOSTOMY, POSSIBLE LAPAROSCOPY, POSSIBLE LAPAROTOMY  Anesthesia type: General    Vitals Value Taken Time   Temp 36 °C (96.8 °F) 09/14/22 1640   Pulse 94 09/14/22 1640   Resp 15 09/14/22 1640   SpO2 95 % 09/14/22 1640   /69 09/14/22 1640         Patient Location: PACU Phase 1  Post-op Vital Signs:stable  Level of Consciousness: awake, alert, participates in exam and oriented  Respiratory Status: spontaneous ventilation  Cardiovascular stable  Hydration: euvolemic  Pain Management: adequately controlled  Handoff: Handoff to receiving nurse was performed and questions were answered  Vomiting: none  Nausea: None  Airway Patency:patent  Post-op Assessment: awake, alert, appropriately conversant, or baseline, no complications, patient tolerated procedure well with no complications, no evidence of recall, dentition within defined limits, moving all extremities and No Corneal Abrasion      No complications documented.   02-Mar-2021

## 2022-11-02 ENCOUNTER — APPOINTMENT (OUTPATIENT)
Dept: UROGYNECOLOGY | Facility: CLINIC | Age: 52
End: 2022-11-02

## 2022-11-24 ENCOUNTER — NON-APPOINTMENT (OUTPATIENT)
Age: 52
End: 2022-11-24

## 2023-01-24 ENCOUNTER — NON-APPOINTMENT (OUTPATIENT)
Age: 53
End: 2023-01-24

## 2023-01-29 ENCOUNTER — NON-APPOINTMENT (OUTPATIENT)
Age: 53
End: 2023-01-29

## 2023-01-31 ENCOUNTER — APPOINTMENT (OUTPATIENT)
Dept: UROLOGY | Facility: CLINIC | Age: 53
End: 2023-01-31
Payer: MEDICAID

## 2023-01-31 VITALS
DIASTOLIC BLOOD PRESSURE: 94 MMHG | WEIGHT: 160 LBS | BODY MASS INDEX: 27.31 KG/M2 | SYSTOLIC BLOOD PRESSURE: 128 MMHG | HEIGHT: 64 IN | HEART RATE: 80 BPM

## 2023-01-31 DIAGNOSIS — R31.29 OTHER MICROSCOPIC HEMATURIA: ICD-10-CM

## 2023-01-31 DIAGNOSIS — N39.0 URINARY TRACT INFECTION, SITE NOT SPECIFIED: ICD-10-CM

## 2023-01-31 PROCEDURE — 99214 OFFICE O/P EST MOD 30 MIN: CPT

## 2023-01-31 PROCEDURE — 81003 URINALYSIS AUTO W/O SCOPE: CPT | Mod: QW

## 2023-02-01 PROBLEM — R31.29 MICROSCOPIC HEMATURIA: Status: ACTIVE | Noted: 2021-08-30

## 2023-02-01 PROBLEM — N39.0 RECURRENT UTI: Status: ACTIVE | Noted: 2021-09-14

## 2023-02-01 LAB
BILIRUB UR QL STRIP: NORMAL
COLLECTION METHOD: NORMAL
GLUCOSE UR-MCNC: NORMAL
HCG UR QL: 0.2 EU/DL
HGB UR QL STRIP.AUTO: NORMAL
KETONES UR-MCNC: NORMAL
LEUKOCYTE ESTERASE UR QL STRIP: NORMAL
NITRITE UR QL STRIP: NORMAL
PH UR STRIP: 5.5
PROT UR STRIP-MCNC: NORMAL
SP GR UR STRIP: 1.03
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
## 2023-03-01 ENCOUNTER — APPOINTMENT (OUTPATIENT)
Dept: SURGERY | Facility: CLINIC | Age: 53
End: 2023-03-01
Payer: MEDICAID

## 2023-03-01 VITALS
TEMPERATURE: 97.3 F | SYSTOLIC BLOOD PRESSURE: 112 MMHG | HEART RATE: 107 BPM | OXYGEN SATURATION: 96 % | BODY MASS INDEX: 27.31 KG/M2 | DIASTOLIC BLOOD PRESSURE: 90 MMHG | HEIGHT: 64 IN | WEIGHT: 160 LBS

## 2023-03-01 PROCEDURE — 99203 OFFICE O/P NEW LOW 30 MIN: CPT | Mod: 25

## 2023-03-01 PROCEDURE — 46600 DIAGNOSTIC ANOSCOPY SPX: CPT

## 2023-03-07 ENCOUNTER — APPOINTMENT (OUTPATIENT)
Dept: UROLOGY | Facility: CLINIC | Age: 53
End: 2023-03-07

## 2023-03-07 NOTE — HISTORY OF PRESENT ILLNESS
[FreeTextEntry1] : Patient is a 53-year-old female with a past medical history of Hypertension, Nephrolithiasis, past surgical history of Kidney Stone Surgery and Thumb Surgery who presents for evaluation of Perianal itching. Patient reports that 2 weeks ago after shaving the area she developed irritation since then it's become more severe with her primary complaint of itching she reports itching throughout the day worse at night she reports daily bowel movements. She has been placing multiple ointments on the area without improvement. Patient denies recent fevers chills nausea, vomiting, abdominal pain, constipation, diarrhea or blood in her stool. She denies a family history of colon cancer, rectal cancer, inflammatory bowel disease. The last colonoscopy. She states was approximately five years ago with Dr. Romero we do not have those results.

## 2023-03-07 NOTE — ASSESSMENT
[FreeTextEntry1] : 53-year-old female Pruritis Ani.\par \par I discussed the pathology of Pruritis Ani with the patient. I recommended\par she discontinue all topical ointments to the area and keep the area clean and dry using water and a damp cloth. She used Calmoseptine as a barrier cream. We will see her back in 2 to 3 months.

## 2023-03-07 NOTE — PROCEDURE
[FreeTextEntry1] : Digital exam anoscope showed normal sphincter tone, normal internal hemorrhoids and no masses.\par

## 2023-03-07 NOTE — ADDENDUM
[FreeTextEntry1] : I, Cristi Helms assisted in documentation on 03/01/2023 acting as a scribe for Dr. Wiliam Walsh.\par

## 2023-03-07 NOTE — PHYSICAL EXAM
[Respiratory Effort] : normal respiratory effort [Normal Rate and Rhythm] : normal rate and rhythm [de-identified] :  External exam shows irritation of the perianal skin no fissures fissures abscesses or excoriations noted. [de-identified] : awake, alert, no acute distress.

## 2023-03-22 ENCOUNTER — APPOINTMENT (OUTPATIENT)
Dept: PEDIATRIC ALLERGY IMMUNOLOGY | Facility: CLINIC | Age: 53
End: 2023-03-22
Payer: MEDICAID

## 2023-03-22 VITALS
TEMPERATURE: 97.1 F | DIASTOLIC BLOOD PRESSURE: 80 MMHG | HEIGHT: 64 IN | SYSTOLIC BLOOD PRESSURE: 120 MMHG | BODY MASS INDEX: 27.31 KG/M2 | WEIGHT: 160 LBS

## 2023-03-22 DIAGNOSIS — L27.0 GENERALIZED SKIN ERUPTION DUE TO DRUGS AND MEDICAMENTS TAKEN INTERNALLY: ICD-10-CM

## 2023-03-22 PROCEDURE — 99204 OFFICE O/P NEW MOD 45 MIN: CPT

## 2023-03-22 NOTE — SOCIAL HISTORY
[Apartment] : [unfilled] lives in an apartment  [Radiator/Baseboard] : heating provided by radiator(s)/baseboard(s) [Window Units] : air conditioning provided by window units [None] : none [Smokers in Household] : there are smokers in the home [Humidifier] : does not use a humidifier [Dehumidifier] : does not use a dehumidifier [Dust Mite Covers] : does not have dust mite covers [Feather Pillows] : does not have feather pillows [Feather Comforter] : does not have a feather comforter [Bedroom] : not in the bedroom [Basement] : not in the basement [Living Area] : not in the living area

## 2023-03-22 NOTE — ASSESSMENT
[FreeTextEntry1] : 1. Pruritis - Already saw Colorectal surgeon and continue treatment.\par \par 2. Drug Rash - Resolved She should not take nifedipine.

## 2023-03-22 NOTE — HISTORY OF PRESENT ILLNESS
[de-identified] : EMELY SCHULTZ is a 53 year yo female who In January she saw cardiologist and they told her pressure has been elevated for a while so they put her on Nifedipine so 5 days later she started getting itchy so she waited it out for a while then stopped so on the 30 she went to urgent care they told her it could be a fungal infection so they put her on clotrimazole which helped but she ran out so she called GYN on on February 10 in groin and anus and was prescribed triamcinolone 0.01 on 2/11 she tried Zyrtec which itch to body went away 2/12 she stopped triamcinolone went back to GYN test were done got yeast infection in which she was given fluconazole 50 mg she had to take for 6 days she was given Terazol cream for 3 days and ketoconazole cream nothing work march 1st she was given Mupirocin ointment 2% and sulfamethoxazole she went back to GYN because nothing was working  she was diagnosed with lichen scleroses she was given clobetasol 0.05 itching got worse she stopped on 3/16 on 3 17 she went to dermatologist she was told to stop taking clobetasol and was given triamcinolone and was told she does not have  lichen scleroses and she started triamcinolone she goes to surgeon  and he told her he does not think it is anything just irritation from shaving she went and got Calmoseptine which helped a bit which burned her skin so she got better and worse at the same time she used Desitin and Aquaphor.\par \par

## 2023-03-28 ENCOUNTER — APPOINTMENT (OUTPATIENT)
Dept: DERMATOLOGY | Facility: CLINIC | Age: 53
End: 2023-03-28
Payer: MEDICAID

## 2023-03-28 ENCOUNTER — OUTPATIENT (OUTPATIENT)
Dept: OUTPATIENT SERVICES | Facility: HOSPITAL | Age: 53
LOS: 1 days | End: 2023-03-28
Payer: MEDICAID

## 2023-03-28 DIAGNOSIS — S63.641A SPRAIN OF METACARPOPHALANGEAL JOINT OF RIGHT THUMB, INITIAL ENCOUNTER: Chronic | ICD-10-CM

## 2023-03-28 DIAGNOSIS — L29.3 ANOGENITAL PRURITUS, UNSPECIFIED: ICD-10-CM

## 2023-03-28 DIAGNOSIS — Z87.828 PERSONAL HISTORY OF OTHER (HEALED) PHYSICAL INJURY AND TRAUMA: Chronic | ICD-10-CM

## 2023-03-28 DIAGNOSIS — Z00.00 ENCOUNTER FOR GENERAL ADULT MEDICAL EXAMINATION WITHOUT ABNORMAL FINDINGS: ICD-10-CM

## 2023-03-28 PROCEDURE — 99203 OFFICE O/P NEW LOW 30 MIN: CPT | Mod: GC

## 2023-03-28 PROCEDURE — 99213 OFFICE O/P EST LOW 20 MIN: CPT

## 2023-03-28 RX ORDER — LIDOCAINE 5 G/100G
5 OINTMENT TOPICAL
Qty: 1 | Refills: 4 | Status: ACTIVE | COMMUNITY
Start: 2023-03-28 | End: 1900-01-01

## 2023-03-28 RX ORDER — HYDROCORTISONE 10 MG/G
1 OINTMENT TOPICAL TWICE DAILY
Qty: 1 | Refills: 2 | Status: ACTIVE | COMMUNITY
Start: 2023-03-28 | End: 1900-01-01

## 2023-03-28 NOTE — PHYSICAL EXAM
[Alert] : alert [Oriented x 3] : ~L oriented x 3 [Genitalia] : Genitalia [FreeTextEntry3] : Slight color variation in labia majora otherwise unremarkable, does not appear hypo/hypertrophic. No visible excoriations.

## 2023-03-28 NOTE — HISTORY OF PRESENT ILLNESS
[FreeTextEntry1] : Pruritus [de-identified] : 53 year yo female who in Jan 2022 was started on nifedipine by cardiology, 5 days later she started getting itchy in legs, groin, abdomen, trunk. She stopped taking nifedipine and changed to losartan with some improvement, then self-discontinued losartan. Generalized itchiness stopped but continued to have itchiness in groin and perianal area. Has been seen in urgent care and by Gyn multiple times. Has been treated for possible fungal infection but then stopped due to "negative result." Also has been told she had lichen sclerosis. Has trial various steroids and antifungals with limited relief. Seen by dermatologist 2 weeks ago was told that not lichen sclerosis. Stopped clobetasol due to high potency and started on Triamcinolone 0.025%. Seen by Allergist on 3/22/23 who diagnosed drug rash from nifedipine. Patient already stopped taking nifedipine at that time. Patient denies any other known drug allergies. Denies any changes to soaps, detergents, clothing, bathing/grooming routine.

## 2023-03-28 NOTE — ASSESSMENT
[FreeTextEntry1] : Pt is a 52 y/o F with no significant PMH presenting with groin and perianal pruritus since Jan 2023 after starting nifedipine. Pruritus was generalized, then stopped after discontinuation of nifedipine but continued in the groin.\par \par #Pruritus in groin\par - 1% hydrocortisone ointment BID\par - 5% Lidocaine ointment BID or more frequently as needed\par - Patient counselled on irritation-pruritis-irritation cycle, and breaking the cycle by reducing itching and irritants such as close shaving.\par \par #Drug rash due to nifedipine - resolved with discontinuation\par \par RTC in 2-3 weeks if needed

## 2023-03-28 NOTE — END OF VISIT
[] : Resident [FreeTextEntry3] : Genital and perianal itch w/o clinical findings.\par Need to break the itch/scratch cycle and hopefully all symptoms will subside,

## 2023-04-11 ENCOUNTER — APPOINTMENT (OUTPATIENT)
Dept: DERMATOLOGY | Facility: CLINIC | Age: 53
End: 2023-04-11
Payer: MEDICAID

## 2023-04-11 ENCOUNTER — APPOINTMENT (OUTPATIENT)
Dept: SURGERY | Facility: CLINIC | Age: 53
End: 2023-04-11
Payer: MEDICAID

## 2023-04-11 ENCOUNTER — OUTPATIENT (OUTPATIENT)
Dept: OUTPATIENT SERVICES | Facility: HOSPITAL | Age: 53
LOS: 1 days | End: 2023-04-11
Payer: MEDICAID

## 2023-04-11 VITALS
OXYGEN SATURATION: 96 % | HEIGHT: 64 IN | WEIGHT: 160 LBS | DIASTOLIC BLOOD PRESSURE: 86 MMHG | TEMPERATURE: 97 F | HEART RATE: 116 BPM | BODY MASS INDEX: 27.31 KG/M2 | SYSTOLIC BLOOD PRESSURE: 124 MMHG

## 2023-04-11 DIAGNOSIS — Z87.828 PERSONAL HISTORY OF OTHER (HEALED) PHYSICAL INJURY AND TRAUMA: Chronic | ICD-10-CM

## 2023-04-11 DIAGNOSIS — N95.2 POSTMENOPAUSAL ATROPHIC VAGINITIS: ICD-10-CM

## 2023-04-11 DIAGNOSIS — Z00.00 ENCOUNTER FOR GENERAL ADULT MEDICAL EXAMINATION WITHOUT ABNORMAL FINDINGS: ICD-10-CM

## 2023-04-11 DIAGNOSIS — S63.641A SPRAIN OF METACARPOPHALANGEAL JOINT OF RIGHT THUMB, INITIAL ENCOUNTER: Chronic | ICD-10-CM

## 2023-04-11 PROCEDURE — 99212 OFFICE O/P EST SF 10 MIN: CPT

## 2023-04-11 PROCEDURE — 99202 OFFICE O/P NEW SF 15 MIN: CPT

## 2023-04-11 PROCEDURE — 99213 OFFICE O/P EST LOW 20 MIN: CPT

## 2023-04-11 NOTE — PHYSICAL EXAM
[Alert] : alert [Oriented x 3] : ~L oriented x 3 [Hair] : Hair [FreeTextEntry3] : Patient prefers to be examined by gynecologist on 04/12

## 2023-04-11 NOTE — ASSESSMENT
[FreeTextEntry1] : 53 year old female patient with history of HTN and rash secondary to nifedipine presenting for worsening groin pruritis. \par \par \par Groin Pruritis\par * Minimal improvement with Hydrocortisone 1% BID so stopped after 1 week\par * Some improvement with lidocaine ointment 5% BID-TID but concerned about long term use\par - Follow up outpatient with gynecology to follow up on STD panel, urinalysis\par - In case of no signs of infection per gynecology on 04/12, will consider starting patient on topical tacrolimus \par \par \par Rash Secondary Nifedipine\par - Educated about importance of cessation

## 2023-04-11 NOTE — HISTORY OF PRESENT ILLNESS
[FreeTextEntry1] : Follow Up [de-identified] : 53 year old female who in Jan 2022 was started on nifedipine by cardiology, 5 days later she started getting itchy in legs, groin, abdomen, trunk. She stopped taking nifedipine and changed to losartan with some improvement, then self-discontinued losartan. Generalized itchiness stopped but continued to have itchiness in groin and perianal area. Has been seen in urgent care and by Gyn multiple times. Has been treated for possible fungal infection but then stopped due to "negative result." Also has been told she had lichen sclerosis. Has trial various steroids and antifungals with limited relief. Seen by dermatologist 2 weeks ago was told that not lichen sclerosis. Stopped clobetasol due to high potency and started on Triamcinolone 0.025%. Seen by Allergist on 3/22/23 who diagnosed drug rash from nifedipine. Patient already stopped taking nifedipine at that time. Patient denies any other known drug allergies. Denies any changes to soaps, detergents, clothing, bathing/grooming routine. \par \par Interval History: patient reports not tolerating hydrocortisone after 1 week of treatment due to worsening itchiness.\par She notes some improvement with lidocaine ointment (has been using it twice-three times daily) but is concerned since she would not like to be on it for a long time.\par She notes that a few days ago, her itchiness along vulvar area got worse associated with redness, swelling, and wetness.\par She denies fever or chills or vaginal discharge.

## 2023-04-11 NOTE — END OF VISIT
[] : Resident [FreeTextEntry3] : Diego has temporary rrelief with lido ointment.\par She says HC ointment made it worse.\par Consider topical tacrolimus.

## 2023-04-12 DIAGNOSIS — N95.2 POSTMENOPAUSAL ATROPHIC VAGINITIS: ICD-10-CM

## 2023-04-19 ENCOUNTER — APPOINTMENT (OUTPATIENT)
Dept: PEDIATRIC ALLERGY IMMUNOLOGY | Facility: CLINIC | Age: 53
End: 2023-04-19

## 2023-04-25 ENCOUNTER — APPOINTMENT (OUTPATIENT)
Dept: UROGYNECOLOGY | Facility: CLINIC | Age: 53
End: 2023-04-25

## 2023-04-28 NOTE — PHYSICAL EXAM
[Respiratory Effort] : normal respiratory effort [Normal Rate and Rhythm] : normal rate and rhythm [de-identified] : awake, alert, no acute distress.

## 2023-04-28 NOTE — HISTORY OF PRESENT ILLNESS
[FreeTextEntry1] : Patient is a 53F with a past medical history of Hypertension, Nephrolithiasis, past surgical history of Kidney Stone Surgery and Thumb Surgery who presents for evaluation of Perianal itching. On the patient's last visit, she was found to have irritation of the perianal skin. We recommended her to use Calmoseptine. She did not see improvement with that. She has been further evaluated by dermatology\par Dr. Oneill and gynecology. She has been given additional other topical ointments including triamcinolone, clobetasol, lidocaine, and hydrocortisone, without improvement. Patient reports worsening of her condition involving the vagina as well. She continues to have significant itching to the area. Patient denies recent fevers chills nausea, vomiting, abdominal pain, constipation, diarrhea or blood in her stool. She denies a family history of colon cancer, rectal cancer, inflammatory bowel disease. The last colonoscopy. She states was approximately five years ago with Dr. Romero we do not have those results.

## 2023-04-28 NOTE — ASSESSMENT
[FreeTextEntry1] : 53F with pruritus ani. \par \par I discussed the pathology with the patient. Given that her symptoms have spread and include the entirety of the perineum, perianal area, and the vagina, it is unclear what the cause is. She is to continue to undergo testing with GYN and treatments with dermatology. We will see her back as needed.

## 2023-05-18 ENCOUNTER — APPOINTMENT (OUTPATIENT)
Dept: UROLOGY | Facility: CLINIC | Age: 53
End: 2023-05-18
Payer: MEDICAID

## 2023-05-18 VITALS
HEIGHT: 64 IN | BODY MASS INDEX: 27.31 KG/M2 | DIASTOLIC BLOOD PRESSURE: 89 MMHG | SYSTOLIC BLOOD PRESSURE: 132 MMHG | WEIGHT: 160 LBS | TEMPERATURE: 97.3 F | RESPIRATION RATE: 18 BRPM | HEART RATE: 80 BPM | OXYGEN SATURATION: 98 %

## 2023-05-18 DIAGNOSIS — Z87.442 PERSONAL HISTORY OF URINARY CALCULI: ICD-10-CM

## 2023-05-18 DIAGNOSIS — R39.15 URGENCY OF URINATION: ICD-10-CM

## 2023-05-18 DIAGNOSIS — N39.0 URINARY TRACT INFECTION, SITE NOT SPECIFIED: ICD-10-CM

## 2023-05-18 PROCEDURE — 99214 OFFICE O/P EST MOD 30 MIN: CPT

## 2023-05-18 NOTE — ASU PREOP CHECKLIST - BOWEL PREP
Patient Specific Counseling (Will Not Stick From Patient To Patient): Healing well. Detail Level: Detailed n/a

## 2023-05-18 NOTE — HISTORY OF PRESENT ILLNESS
[FreeTextEntry1] : EMELY SCHULTZ is a 51 year old female who presents for consultation for recurrent UTIs on macrobid suppression, OAB sp PTNS, gross hematuria and consultation for right nephrolithiasis sp R ESWL 3/2022 stone successfully fragmented. First stone episode\par \par Patient reports that the flank pain and pelvic pain has resolved since surgery.  She is satisfied with her result.\par She was having flank pain a few months ago and underwent a KUB which was negative for stones.  She has had a recent urinary tract infection which was treated.  Currently reports stable urinary frequency and urgency however prefers no treatment at this time\par \par KUB December 2022 demonstrates no stones regional radiology\par \par KUB images visualized March 2022 demonstrating complete clearance of right stone burden\par Preop kub 1.4 cm\par \par Urinalysis 2 red blood cells March 2023\par \par Previously\par CTU w/wo iv Contrast images from 12/2021: \par  Mild hydronephrosis secondary to a 1.4 cm renal pelvic calculus (1175 HU). No other urinary tract calculi seen. No left hydronephrosis. No bladder calculi or filling defect. Multiple pelvic phleboliths.\par \par KUB images from 12/2021: 1.4 cm right renal pelvic calculus. No other urinary tract calculi seen. Multiple pelvic phleboliths.\par \par Previously: \par Denies  PMH including previous kidney stones, recurrent UTIs. \par Family History: No  malignancies\par Social History: Non-smoker\par \par Old notes reviewed with Dr. Hawkins\par PVR 0cc\par

## 2023-05-18 NOTE — ASSESSMENT
[FreeTextEntry1] : EMELY SCHULTZ is a 51 year old female who presents for consultation for recurrent UTIs on macrobid suppression, OAB sp PTNS, gross hematuria and consultation for right nephrolithiasis sp R ESWL 3/2022 stone successfully fragmented. First stone episode\par \par Currently stone free.  She will continue to focus on stone dietary prevention which we reiterated\par Regarding her urinary frequency and irritative voiding symptoms she will continue follow-up with urogyn or dr lord, elects observation for now.  \par She will follow-up in 6 months with a KUB and sono prior\par \par \par Stone nutritional counseling given--First and foremost, the most important recommendation is to increase water intake. I have recommended that she drink enough water to produce 2.5L of urine per day. More easily put, I have recommended the patient consume enough water to keep Her urine clear. I have also recommended adding crystal light (or lemon) which contains citrate which prevents stone formation. I have also stressed the importance of minimizing salt and animal protein in the diet.\par

## 2023-06-15 ENCOUNTER — APPOINTMENT (OUTPATIENT)
Dept: DERMATOLOGY | Facility: CLINIC | Age: 53
End: 2023-06-15

## 2023-06-19 ENCOUNTER — OUTPATIENT (OUTPATIENT)
Dept: OUTPATIENT SERVICES | Facility: HOSPITAL | Age: 53
LOS: 1 days | End: 2023-06-19
Payer: MEDICAID

## 2023-06-19 DIAGNOSIS — S63.641A SPRAIN OF METACARPOPHALANGEAL JOINT OF RIGHT THUMB, INITIAL ENCOUNTER: Chronic | ICD-10-CM

## 2023-06-19 DIAGNOSIS — Z87.828 PERSONAL HISTORY OF OTHER (HEALED) PHYSICAL INJURY AND TRAUMA: Chronic | ICD-10-CM

## 2023-06-19 DIAGNOSIS — R10.2 PELVIC AND PERINEAL PAIN: ICD-10-CM

## 2023-06-19 DIAGNOSIS — Z00.8 ENCOUNTER FOR OTHER GENERAL EXAMINATION: ICD-10-CM

## 2023-06-19 PROCEDURE — 76830 TRANSVAGINAL US NON-OB: CPT

## 2023-06-19 PROCEDURE — 76830 TRANSVAGINAL US NON-OB: CPT | Mod: 26

## 2023-06-20 DIAGNOSIS — R10.2 PELVIC AND PERINEAL PAIN: ICD-10-CM

## 2023-06-21 ENCOUNTER — NON-APPOINTMENT (OUTPATIENT)
Age: 53
End: 2023-06-21

## 2023-06-28 ENCOUNTER — APPOINTMENT (OUTPATIENT)
Dept: OBGYN | Facility: CLINIC | Age: 53
End: 2023-06-28
Payer: MEDICAID

## 2023-06-28 VITALS
BODY MASS INDEX: 27.31 KG/M2 | WEIGHT: 160 LBS | DIASTOLIC BLOOD PRESSURE: 80 MMHG | SYSTOLIC BLOOD PRESSURE: 129 MMHG | HEIGHT: 64 IN

## 2023-06-28 DIAGNOSIS — N76.0 ACUTE VAGINITIS: ICD-10-CM

## 2023-06-28 DIAGNOSIS — L29.3 ANOGENITAL PRURITUS, UNSPECIFIED: ICD-10-CM

## 2023-06-28 PROCEDURE — 99203 OFFICE O/P NEW LOW 30 MIN: CPT

## 2023-06-28 NOTE — HISTORY OF PRESENT ILLNESS
[FreeTextEntry1] : 54 yo P2 presents for vulvar complaint. She has been seen by prior gyn and tested for STI's and BV and candida and reports all tests were negative. SHe has used terconazole, metronidazole, boric acid, clobetasol and fluconazole without improvement in symptoms. She was also seen by a dermatologist who did not recommend a biopsy, told her to stop clobetasol and try triamcinolone. \par \par obhx:  x 2\par gynhx: last pap smear , normal per patient. She is not sexually active.LMP 47 yo, denies any PMB at this time.

## 2023-06-28 NOTE — DISCUSSION/SUMMARY
[FreeTextEntry1] : 52 yo for vaginitis\par -f/u cultures, mycoplasma, ureaplasma, genital culture - will call w/ results. \par - discussed baking soda sitz baths, patient declined, does not take baths or do sitz baths. \par

## 2023-06-28 NOTE — PHYSICAL EXAM
[Appropriately responsive] : appropriately responsive [Vulvar Atrophy] : vulvar atrophy [Labia Majora] : normal [Normal] : normal [Uterine Adnexae] : normal [FreeTextEntry1] : erythema as posterior forchette

## 2023-07-01 LAB — BACTERIA GENITAL AEROBE CULT: NORMAL

## 2023-07-03 ENCOUNTER — APPOINTMENT (OUTPATIENT)
Dept: SURGERY | Facility: CLINIC | Age: 53
End: 2023-07-03
Payer: MEDICAID

## 2023-07-03 DIAGNOSIS — L29.0 PRURITUS ANI: ICD-10-CM

## 2023-07-03 PROCEDURE — 99213 OFFICE O/P EST LOW 20 MIN: CPT

## 2023-07-07 PROBLEM — L29.0 PRURITUS ANI: Status: ACTIVE | Noted: 2023-03-02

## 2023-07-07 NOTE — PHYSICAL EXAM
[FreeTextEntry1] : General: Awake, Alert, No Acute Distress\par Respiratory: Normal Respiratory Effort\par Cardiovascular: Normal Rate and Rhythm\par Rectal: External examination shows complete resolution of the perineal irritation. No evidence of fissures, fistulas, abscesses, excoriations or condyloma.\par \par

## 2023-07-07 NOTE — ASSESSMENT
[FreeTextEntry1] : 53-year-old female with pruritus ani.\par \par I discussed the pathology with the patient. Just unclear what's causing her to have these symptoms. I recommend that she continue with her current regimen. She can use topical barrier creams to protect the perineal skin. We'll see her back as needed.\par

## 2023-07-07 NOTE — HISTORY OF PRESENT ILLNESS
[FreeTextEntry1] : Patient is a 53F with a past medical history of Hypertension, Nephrolithiasis, past surgical history of Kidney Stone Surgery and Thumb Surgery who presents for follow-up of pruritus ani. Since the patient's last visit, she has made some dietary changes which have nearly completely resolved the patient's itching. She does report a mild pinching feeling on her right side intermittently, that is shortened duration and spontaneously resolves. Patient denies recent fevers chills nausea, vomiting, abdominal pain, constipation, diarrhea or blood in her stool. She denies a family history of colon cancer, rectal cancer, inflammatory bowel disease. The last colonoscopy. She states was approximately five years ago with Dr. Romero we do not have those results. \par

## 2023-07-07 NOTE — ADDENDUM
[FreeTextEntry1] : I, Mariza Landry ([facility]) assisted in filling out this chart under the dictation of Dr. Wiliam Walsh on 07/05/2023.\par

## 2023-07-10 DIAGNOSIS — N76.1 SUBACUTE AND CHRONIC VAGINITIS: ICD-10-CM

## 2023-07-10 LAB
A VAGINAE DNA VAG QL NAA+PROBE: NORMAL
BVAB2 DNA VAG QL NAA+PROBE: NORMAL
C KRUSEI DNA VAG QL NAA+PROBE: NEGATIVE
C TRACH RRNA SPEC QL NAA+PROBE: NEGATIVE
CANDIDA DNA VAG QL NAA+PROBE: NEGATIVE
MEGA1 DNA VAG QL NAA+PROBE: NORMAL
MYCOPLASMA HOMINIS CULTURE: NEGATIVE
N GONORRHOEA RRNA SPEC QL NAA+PROBE: NEGATIVE
T VAGINALIS RRNA SPEC QL NAA+PROBE: NEGATIVE
UREAPLASMA CULTURE: NEGATIVE

## 2023-07-10 RX ORDER — CLINDAMYCIN PHOSPHATE 20 MG/G
2 CREAM VAGINAL
Qty: 1 | Refills: 5 | Status: ACTIVE | COMMUNITY
Start: 2023-07-10 | End: 1900-01-01

## 2023-07-31 RX ORDER — CLOBETASOL PROPIONATE 0.5 MG/G
0.05 GEL TOPICAL
Qty: 1 | Refills: 0 | Status: ACTIVE | COMMUNITY
Start: 2023-07-31 | End: 1900-01-01

## 2023-08-07 ENCOUNTER — TRANSCRIPTION ENCOUNTER (OUTPATIENT)
Age: 53
End: 2023-08-07

## 2023-08-08 ENCOUNTER — APPOINTMENT (OUTPATIENT)
Dept: UROLOGY | Facility: CLINIC | Age: 53
End: 2023-08-08

## 2023-09-19 ENCOUNTER — APPOINTMENT (OUTPATIENT)
Dept: OBGYN | Facility: CLINIC | Age: 53
End: 2023-09-19
Payer: MEDICAID

## 2023-09-19 VITALS
HEIGHT: 64 IN | WEIGHT: 167 LBS | DIASTOLIC BLOOD PRESSURE: 75 MMHG | SYSTOLIC BLOOD PRESSURE: 119 MMHG | BODY MASS INDEX: 28.51 KG/M2

## 2023-09-19 PROCEDURE — 56605 BIOPSY OF VULVA/PERINEUM: CPT

## 2023-09-26 ENCOUNTER — APPOINTMENT (OUTPATIENT)
Dept: OBGYN | Facility: CLINIC | Age: 53
End: 2023-09-26
Payer: MEDICAID

## 2023-09-26 DIAGNOSIS — Z00.00 ENCOUNTER FOR GENERAL ADULT MEDICAL EXAMINATION W/OUT ABNORMAL FINDINGS: ICD-10-CM

## 2023-09-26 DIAGNOSIS — N89.8 OTHER SPECIFIED NONINFLAMMATORY DISORDERS OF VAGINA: ICD-10-CM

## 2023-09-26 PROCEDURE — 99213 OFFICE O/P EST LOW 20 MIN: CPT

## 2023-10-06 ENCOUNTER — APPOINTMENT (OUTPATIENT)
Dept: SURGERY | Facility: CLINIC | Age: 53
End: 2023-10-06

## 2023-10-24 ENCOUNTER — NON-APPOINTMENT (OUTPATIENT)
Age: 53
End: 2023-10-24

## 2023-12-20 LAB — CORE LAB BIOPSY: NORMAL

## 2024-02-27 ENCOUNTER — APPOINTMENT (OUTPATIENT)
Dept: UROLOGY | Facility: CLINIC | Age: 54
End: 2024-02-27

## 2024-08-05 NOTE — H&P PST ADULT - FALL HARM RISK - PT AGE POPULATION HIDDEN
Hospitalist Infectious Disease Nephrology Pulmonology Hospitalist Infectious Disease Nephrology Nephrology Pulmonology Infectious Disease Pulmonology Pulmonology Internal Medicine Nephrology Cardiology Cardiology Cardiology Adult Hospitalist

## 2025-03-04 ENCOUNTER — NON-APPOINTMENT (OUTPATIENT)
Age: 55
End: 2025-03-04

## 2025-03-04 DIAGNOSIS — N90.89 OTHER SPECIFIED NONINFLAMMATORY DISORDERS OF VULVA AND PERINEUM: ICD-10-CM

## 2025-03-04 DIAGNOSIS — Z86.19 PERSONAL HISTORY OF OTHER INFECTIOUS AND PARASITIC DISEASES: ICD-10-CM

## 2025-03-04 DIAGNOSIS — N89.8 OTHER SPECIFIED NONINFLAMMATORY DISORDERS OF VAGINA: ICD-10-CM

## 2025-03-04 DIAGNOSIS — Z92.89 PERSONAL HISTORY OF OTHER MEDICAL TREATMENT: ICD-10-CM

## 2025-03-04 DIAGNOSIS — Z78.9 OTHER SPECIFIED HEALTH STATUS: ICD-10-CM
